# Patient Record
Sex: FEMALE | Race: WHITE | URBAN - METROPOLITAN AREA
[De-identification: names, ages, dates, MRNs, and addresses within clinical notes are randomized per-mention and may not be internally consistent; named-entity substitution may affect disease eponyms.]

---

## 2017-04-13 ENCOUNTER — TRANSFERRED RECORDS (OUTPATIENT)
Dept: HEALTH INFORMATION MANAGEMENT | Facility: CLINIC | Age: 52
End: 2017-04-13

## 2017-04-26 ENCOUNTER — ANESTHESIA EVENT (OUTPATIENT)
Dept: SURGERY | Facility: CLINIC | Age: 52
DRG: 462 | End: 2017-04-26
Payer: COMMERCIAL

## 2017-04-26 ENCOUNTER — ANESTHESIA (OUTPATIENT)
Dept: SURGERY | Facility: CLINIC | Age: 52
DRG: 462 | End: 2017-04-26
Payer: COMMERCIAL

## 2017-04-26 ENCOUNTER — APPOINTMENT (OUTPATIENT)
Dept: PHYSICAL THERAPY | Facility: CLINIC | Age: 52
DRG: 462 | End: 2017-04-26
Attending: ORTHOPAEDIC SURGERY
Payer: COMMERCIAL

## 2017-04-26 ENCOUNTER — HOSPITAL ENCOUNTER (INPATIENT)
Facility: CLINIC | Age: 52
LOS: 3 days | Discharge: HOME HEALTH | DRG: 462 | End: 2017-04-29
Attending: ORTHOPAEDIC SURGERY | Admitting: ORTHOPAEDIC SURGERY
Payer: COMMERCIAL

## 2017-04-26 DIAGNOSIS — Z96.653 S/P TOTAL KNEE REPLACEMENT, BILATERAL: Primary | ICD-10-CM

## 2017-04-26 PROCEDURE — 25000128 H RX IP 250 OP 636: Performed by: NURSE ANESTHETIST, CERTIFIED REGISTERED

## 2017-04-26 PROCEDURE — 25000125 ZZHC RX 250: Performed by: ORTHOPAEDIC SURGERY

## 2017-04-26 PROCEDURE — 25000128 H RX IP 250 OP 636: Performed by: ANESTHESIOLOGY

## 2017-04-26 PROCEDURE — 25800025 ZZH RX 258: Performed by: ANESTHESIOLOGY

## 2017-04-26 PROCEDURE — 25000125 ZZHC RX 250: Performed by: PHYSICIAN ASSISTANT

## 2017-04-26 PROCEDURE — S0020 INJECTION, BUPIVICAINE HYDRO: HCPCS | Performed by: ANESTHESIOLOGY

## 2017-04-26 PROCEDURE — 40000171 ZZH STATISTIC PRE-PROCEDURE ASSESSMENT III: Performed by: ORTHOPAEDIC SURGERY

## 2017-04-26 PROCEDURE — 25800025 ZZH RX 258: Performed by: ORTHOPAEDIC SURGERY

## 2017-04-26 PROCEDURE — 25000125 ZZHC RX 250: Performed by: ANESTHESIOLOGY

## 2017-04-26 PROCEDURE — 97161 PT EVAL LOW COMPLEX 20 MIN: CPT | Mod: GP

## 2017-04-26 PROCEDURE — 99222 1ST HOSP IP/OBS MODERATE 55: CPT | Performed by: PHYSICIAN ASSISTANT

## 2017-04-26 PROCEDURE — 36000093 ZZH SURGERY LEVEL 4 1ST 30 MIN: Performed by: ORTHOPAEDIC SURGERY

## 2017-04-26 PROCEDURE — 25000128 H RX IP 250 OP 636: Performed by: ORTHOPAEDIC SURGERY

## 2017-04-26 PROCEDURE — 27210794 ZZH OR GENERAL SUPPLY STERILE: Performed by: ORTHOPAEDIC SURGERY

## 2017-04-26 PROCEDURE — 25000128 H RX IP 250 OP 636: Performed by: PHYSICIAN ASSISTANT

## 2017-04-26 PROCEDURE — 0SRD0J9 REPLACEMENT OF LEFT KNEE JOINT WITH SYNTHETIC SUBSTITUTE, CEMENTED, OPEN APPROACH: ICD-10-PCS | Performed by: ORTHOPAEDIC SURGERY

## 2017-04-26 PROCEDURE — 27210995 ZZH RX 272: Performed by: ORTHOPAEDIC SURGERY

## 2017-04-26 PROCEDURE — 71000012 ZZH RECOVERY PHASE 1 LEVEL 1 FIRST HR: Performed by: ORTHOPAEDIC SURGERY

## 2017-04-26 PROCEDURE — 71000013 ZZH RECOVERY PHASE 1 LEVEL 1 EA ADDTL HR: Performed by: ORTHOPAEDIC SURGERY

## 2017-04-26 PROCEDURE — 25000125 ZZHC RX 250: Performed by: NURSE ANESTHETIST, CERTIFIED REGISTERED

## 2017-04-26 PROCEDURE — 37000008 ZZH ANESTHESIA TECHNICAL FEE, 1ST 30 MIN: Performed by: ORTHOPAEDIC SURGERY

## 2017-04-26 PROCEDURE — 40000193 ZZH STATISTIC PT WARD VISIT

## 2017-04-26 PROCEDURE — C1776 JOINT DEVICE (IMPLANTABLE): HCPCS | Performed by: ORTHOPAEDIC SURGERY

## 2017-04-26 PROCEDURE — 97530 THERAPEUTIC ACTIVITIES: CPT | Mod: GP

## 2017-04-26 PROCEDURE — 27810169 ZZH OR IMPLANT GENERAL: Performed by: ORTHOPAEDIC SURGERY

## 2017-04-26 PROCEDURE — 0SRC0J9 REPLACEMENT OF RIGHT KNEE JOINT WITH SYNTHETIC SUBSTITUTE, CEMENTED, OPEN APPROACH: ICD-10-PCS | Performed by: ORTHOPAEDIC SURGERY

## 2017-04-26 PROCEDURE — 36000063 ZZH SURGERY LEVEL 4 EA 15 ADDTL MIN: Performed by: ORTHOPAEDIC SURGERY

## 2017-04-26 PROCEDURE — 97110 THERAPEUTIC EXERCISES: CPT | Mod: GP

## 2017-04-26 PROCEDURE — 37000009 ZZH ANESTHESIA TECHNICAL FEE, EACH ADDTL 15 MIN: Performed by: ORTHOPAEDIC SURGERY

## 2017-04-26 PROCEDURE — 12000007 ZZH R&B INTERMEDIATE

## 2017-04-26 PROCEDURE — 99207 ZZC CONSULT E&M CHANGED TO INITIAL LEVEL: CPT | Performed by: PHYSICIAN ASSISTANT

## 2017-04-26 PROCEDURE — 25000132 ZZH RX MED GY IP 250 OP 250 PS 637: Performed by: PHYSICIAN ASSISTANT

## 2017-04-26 DEVICE — BONE CEMENT SIMPLEX W/TOBRAMYCIN 6197-9-001: Type: IMPLANTABLE DEVICE | Site: KNEE | Status: FUNCTIONAL

## 2017-04-26 DEVICE — IMP TIB BASE JJ ATTUNE RP SZ8 CEM 150610008: Type: IMPLANTABLE DEVICE | Site: KNEE | Status: FUNCTIONAL

## 2017-04-26 DEVICE — IMPLANTABLE DEVICE: Type: IMPLANTABLE DEVICE | Site: KNEE | Status: FUNCTIONAL

## 2017-04-26 DEVICE — IMP TIB BASE JJ ATTUNE RP SZ7 CEM 150610007: Type: IMPLANTABLE DEVICE | Site: KNEE | Status: FUNCTIONAL

## 2017-04-26 RX ORDER — LIDOCAINE 40 MG/G
CREAM TOPICAL
Status: DISCONTINUED | OUTPATIENT
Start: 2017-04-26 | End: 2017-04-29 | Stop reason: HOSPADM

## 2017-04-26 RX ORDER — SODIUM CHLORIDE 9 MG/ML
INJECTION, SOLUTION INTRAVENOUS CONTINUOUS
Status: DISCONTINUED | OUTPATIENT
Start: 2017-04-26 | End: 2017-04-29 | Stop reason: HOSPADM

## 2017-04-26 RX ORDER — ONDANSETRON 4 MG/1
4 TABLET, ORALLY DISINTEGRATING ORAL EVERY 6 HOURS PRN
Status: DISCONTINUED | OUTPATIENT
Start: 2017-04-26 | End: 2017-04-29 | Stop reason: HOSPADM

## 2017-04-26 RX ORDER — PROPOFOL 10 MG/ML
INJECTION, EMULSION INTRAVENOUS CONTINUOUS PRN
Status: DISCONTINUED | OUTPATIENT
Start: 2017-04-26 | End: 2017-04-26

## 2017-04-26 RX ORDER — ONDANSETRON 2 MG/ML
INJECTION INTRAMUSCULAR; INTRAVENOUS PRN
Status: DISCONTINUED | OUTPATIENT
Start: 2017-04-26 | End: 2017-04-26

## 2017-04-26 RX ORDER — ONDANSETRON 2 MG/ML
4 INJECTION INTRAMUSCULAR; INTRAVENOUS EVERY 30 MIN PRN
Status: DISCONTINUED | OUTPATIENT
Start: 2017-04-26 | End: 2017-04-26 | Stop reason: HOSPADM

## 2017-04-26 RX ORDER — PAROXETINE 40 MG/1
40 TABLET, FILM COATED ORAL
Status: DISCONTINUED | OUTPATIENT
Start: 2017-04-26 | End: 2017-04-29 | Stop reason: HOSPADM

## 2017-04-26 RX ORDER — OXYCODONE HYDROCHLORIDE 5 MG/1
5-10 TABLET ORAL
Status: DISCONTINUED | OUTPATIENT
Start: 2017-04-26 | End: 2017-04-29 | Stop reason: HOSPADM

## 2017-04-26 RX ORDER — HYDROMORPHONE HYDROCHLORIDE 1 MG/ML
.3-.5 INJECTION, SOLUTION INTRAMUSCULAR; INTRAVENOUS; SUBCUTANEOUS
Status: DISCONTINUED | OUTPATIENT
Start: 2017-04-26 | End: 2017-04-29 | Stop reason: HOSPADM

## 2017-04-26 RX ORDER — FENTANYL CITRATE 50 UG/ML
50-100 INJECTION, SOLUTION INTRAMUSCULAR; INTRAVENOUS
Status: DISCONTINUED | OUTPATIENT
Start: 2017-04-26 | End: 2017-04-26 | Stop reason: HOSPADM

## 2017-04-26 RX ORDER — CEFAZOLIN SODIUM 2 G/100ML
2 INJECTION, SOLUTION INTRAVENOUS EVERY 8 HOURS
Status: COMPLETED | OUTPATIENT
Start: 2017-04-26 | End: 2017-04-27

## 2017-04-26 RX ORDER — HYDROMORPHONE HYDROCHLORIDE 1 MG/ML
.3-.5 INJECTION, SOLUTION INTRAMUSCULAR; INTRAVENOUS; SUBCUTANEOUS EVERY 5 MIN PRN
Status: DISCONTINUED | OUTPATIENT
Start: 2017-04-26 | End: 2017-04-26 | Stop reason: HOSPADM

## 2017-04-26 RX ORDER — CEFAZOLIN SODIUM 1 G/3ML
1 INJECTION, POWDER, FOR SOLUTION INTRAMUSCULAR; INTRAVENOUS SEE ADMIN INSTRUCTIONS
Status: DISCONTINUED | OUTPATIENT
Start: 2017-04-26 | End: 2017-04-26 | Stop reason: HOSPADM

## 2017-04-26 RX ORDER — ZOLPIDEM TARTRATE 5 MG/1
5 TABLET ORAL
Status: DISCONTINUED | OUTPATIENT
Start: 2017-04-26 | End: 2017-04-29 | Stop reason: HOSPADM

## 2017-04-26 RX ORDER — MAGNESIUM HYDROXIDE 1200 MG/15ML
LIQUID ORAL PRN
Status: DISCONTINUED | OUTPATIENT
Start: 2017-04-26 | End: 2017-04-26 | Stop reason: HOSPADM

## 2017-04-26 RX ORDER — ONDANSETRON 2 MG/ML
4 INJECTION INTRAMUSCULAR; INTRAVENOUS EVERY 6 HOURS PRN
Status: DISCONTINUED | OUTPATIENT
Start: 2017-04-26 | End: 2017-04-29 | Stop reason: HOSPADM

## 2017-04-26 RX ORDER — NALOXONE HYDROCHLORIDE 0.4 MG/ML
.1-.4 INJECTION, SOLUTION INTRAMUSCULAR; INTRAVENOUS; SUBCUTANEOUS
Status: DISCONTINUED | OUTPATIENT
Start: 2017-04-26 | End: 2017-04-29 | Stop reason: HOSPADM

## 2017-04-26 RX ORDER — FENTANYL CITRATE 50 UG/ML
25-50 INJECTION, SOLUTION INTRAMUSCULAR; INTRAVENOUS
Status: DISCONTINUED | OUTPATIENT
Start: 2017-04-26 | End: 2017-04-26 | Stop reason: HOSPADM

## 2017-04-26 RX ORDER — SODIUM CHLORIDE, SODIUM LACTATE, POTASSIUM CHLORIDE, CALCIUM CHLORIDE 600; 310; 30; 20 MG/100ML; MG/100ML; MG/100ML; MG/100ML
INJECTION, SOLUTION INTRAVENOUS CONTINUOUS
Status: DISCONTINUED | OUTPATIENT
Start: 2017-04-26 | End: 2017-04-26 | Stop reason: HOSPADM

## 2017-04-26 RX ORDER — FENTANYL CITRATE 50 UG/ML
INJECTION, SOLUTION INTRAMUSCULAR; INTRAVENOUS PRN
Status: DISCONTINUED | OUTPATIENT
Start: 2017-04-26 | End: 2017-04-26

## 2017-04-26 RX ORDER — SCOLOPAMINE TRANSDERMAL SYSTEM 1 MG/1
1 PATCH, EXTENDED RELEASE TRANSDERMAL ONCE
Status: COMPLETED | OUTPATIENT
Start: 2017-04-26 | End: 2017-04-26

## 2017-04-26 RX ORDER — PROPOFOL 10 MG/ML
INJECTION, EMULSION INTRAVENOUS PRN
Status: DISCONTINUED | OUTPATIENT
Start: 2017-04-26 | End: 2017-04-26

## 2017-04-26 RX ORDER — ACETAMINOPHEN 325 MG/1
650 TABLET ORAL EVERY 4 HOURS PRN
Status: DISCONTINUED | OUTPATIENT
Start: 2017-04-29 | End: 2017-04-29 | Stop reason: HOSPADM

## 2017-04-26 RX ORDER — DEXAMETHASONE SODIUM PHOSPHATE 4 MG/ML
INJECTION, SOLUTION INTRA-ARTICULAR; INTRALESIONAL; INTRAMUSCULAR; INTRAVENOUS; SOFT TISSUE PRN
Status: DISCONTINUED | OUTPATIENT
Start: 2017-04-26 | End: 2017-04-26

## 2017-04-26 RX ORDER — MV-MIN/VIT C/GLUT/LYSINE/HB124 1000-50 MG
1 TABLET, EFFERVESCENT ORAL DAILY
COMMUNITY

## 2017-04-26 RX ORDER — CLONAZEPAM 1 MG/1
1 TABLET ORAL DAILY PRN
Status: DISCONTINUED | OUTPATIENT
Start: 2017-04-26 | End: 2017-04-29 | Stop reason: HOSPADM

## 2017-04-26 RX ORDER — AMOXICILLIN 250 MG
1-2 CAPSULE ORAL 2 TIMES DAILY
Status: DISCONTINUED | OUTPATIENT
Start: 2017-04-26 | End: 2017-04-29 | Stop reason: HOSPADM

## 2017-04-26 RX ORDER — ACETAMINOPHEN 325 MG/1
975 TABLET ORAL EVERY 8 HOURS
Status: COMPLETED | OUTPATIENT
Start: 2017-04-26 | End: 2017-04-29

## 2017-04-26 RX ORDER — ONDANSETRON 4 MG/1
4 TABLET, ORALLY DISINTEGRATING ORAL EVERY 30 MIN PRN
Status: DISCONTINUED | OUTPATIENT
Start: 2017-04-26 | End: 2017-04-26 | Stop reason: HOSPADM

## 2017-04-26 RX ORDER — TETRACAINE HCL 10 MG/ML
INJECTION SUBARACHNOID PRN
Status: DISCONTINUED | OUTPATIENT
Start: 2017-04-26 | End: 2017-04-26

## 2017-04-26 RX ORDER — CEFAZOLIN SODIUM 2 G/100ML
2 INJECTION, SOLUTION INTRAVENOUS
Status: COMPLETED | OUTPATIENT
Start: 2017-04-26 | End: 2017-04-26

## 2017-04-26 RX ORDER — HYDROXYZINE HYDROCHLORIDE 25 MG/1
25 TABLET, FILM COATED ORAL EVERY 6 HOURS PRN
Status: DISCONTINUED | OUTPATIENT
Start: 2017-04-26 | End: 2017-04-29 | Stop reason: HOSPADM

## 2017-04-26 RX ORDER — ASPIRIN 325 MG
325 TABLET ORAL DAILY
Status: DISCONTINUED | OUTPATIENT
Start: 2017-04-26 | End: 2017-04-29 | Stop reason: HOSPADM

## 2017-04-26 RX ADMIN — MIDAZOLAM HYDROCHLORIDE 2 MG: 1 INJECTION, SOLUTION INTRAMUSCULAR; INTRAVENOUS at 07:27

## 2017-04-26 RX ADMIN — ACETAMINOPHEN 975 MG: 325 TABLET, FILM COATED ORAL at 18:21

## 2017-04-26 RX ADMIN — HYDROMORPHONE HYDROCHLORIDE 0.5 MG: 1 INJECTION, SOLUTION INTRAMUSCULAR; INTRAVENOUS; SUBCUTANEOUS at 13:53

## 2017-04-26 RX ADMIN — PROPOFOL 20 MG: 10 INJECTION, EMULSION INTRAVENOUS at 11:20

## 2017-04-26 RX ADMIN — TRANEXAMIC ACID 1 G: 100 INJECTION, SOLUTION INTRAVENOUS at 07:43

## 2017-04-26 RX ADMIN — PROPOFOL 20 MG: 10 INJECTION, EMULSION INTRAVENOUS at 10:57

## 2017-04-26 RX ADMIN — CEFAZOLIN SODIUM 1 G: 2 INJECTION, SOLUTION INTRAVENOUS at 11:38

## 2017-04-26 RX ADMIN — EPINEPHRINE 20 ML GIVEN: 1 INJECTION, SOLUTION INTRAMUSCULAR; SUBCUTANEOUS at 06:31

## 2017-04-26 RX ADMIN — PAROXETINE 40 MG: 40 TABLET, FILM COATED ORAL at 18:20

## 2017-04-26 RX ADMIN — EPINEPHRINE 20 ML GIVEN: 1 INJECTION, SOLUTION INTRAMUSCULAR; SUBCUTANEOUS at 06:36

## 2017-04-26 RX ADMIN — SENNOSIDES AND DOCUSATE SODIUM 1 TABLET: 8.6; 5 TABLET ORAL at 19:48

## 2017-04-26 RX ADMIN — TRANEXAMIC ACID 1 G: 100 INJECTION, SOLUTION INTRAVENOUS at 06:51

## 2017-04-26 RX ADMIN — TRANEXAMIC ACID 1 G: 100 INJECTION, SOLUTION INTRAVENOUS at 12:34

## 2017-04-26 RX ADMIN — PROPOFOL 20 MG: 10 INJECTION, EMULSION INTRAVENOUS at 09:52

## 2017-04-26 RX ADMIN — PROPOFOL 30 MG: 10 INJECTION, EMULSION INTRAVENOUS at 12:05

## 2017-04-26 RX ADMIN — DEXAMETHASONE SODIUM PHOSPHATE 4 MG: 4 INJECTION, SOLUTION INTRA-ARTICULAR; INTRALESIONAL; INTRAMUSCULAR; INTRAVENOUS; SOFT TISSUE at 08:21

## 2017-04-26 RX ADMIN — OXYCODONE HYDROCHLORIDE 10 MG: 5 TABLET ORAL at 23:40

## 2017-04-26 RX ADMIN — FENTANYL CITRATE 50 MCG: 50 INJECTION, SOLUTION INTRAMUSCULAR; INTRAVENOUS at 12:15

## 2017-04-26 RX ADMIN — SODIUM CHLORIDE: 9 INJECTION, SOLUTION INTRAVENOUS at 16:34

## 2017-04-26 RX ADMIN — FENTANYL CITRATE 50 MCG: 50 INJECTION, SOLUTION INTRAMUSCULAR; INTRAVENOUS at 11:55

## 2017-04-26 RX ADMIN — TETRACAINE HCL 1 ML: 10 INJECTION SUBARACHNOID at 07:35

## 2017-04-26 RX ADMIN — HYDROMORPHONE HYDROCHLORIDE 0.5 MG: 1 INJECTION, SOLUTION INTRAMUSCULAR; INTRAVENOUS; SUBCUTANEOUS at 13:09

## 2017-04-26 RX ADMIN — FENTANYL CITRATE 50 MCG: 50 INJECTION, SOLUTION INTRAMUSCULAR; INTRAVENOUS at 11:20

## 2017-04-26 RX ADMIN — ACETAMINOPHEN 975 MG: 325 TABLET, FILM COATED ORAL at 23:40

## 2017-04-26 RX ADMIN — CEFAZOLIN SODIUM 2 G: 2 INJECTION, SOLUTION INTRAVENOUS at 19:48

## 2017-04-26 RX ADMIN — MIDAZOLAM HYDROCHLORIDE 2 MG: 1 INJECTION, SOLUTION INTRAMUSCULAR; INTRAVENOUS at 06:45

## 2017-04-26 RX ADMIN — PROPOFOL 20 MG: 10 INJECTION, EMULSION INTRAVENOUS at 08:50

## 2017-04-26 RX ADMIN — PROPOFOL 20 MG: 10 INJECTION, EMULSION INTRAVENOUS at 07:40

## 2017-04-26 RX ADMIN — PHENYLEPHRINE HYDROCHLORIDE 100 MCG: 10 INJECTION, SOLUTION INTRAMUSCULAR; INTRAVENOUS; SUBCUTANEOUS at 07:37

## 2017-04-26 RX ADMIN — PROPOFOL 100 MCG/KG/MIN: 10 INJECTION, EMULSION INTRAVENOUS at 07:36

## 2017-04-26 RX ADMIN — FENTANYL CITRATE 100 MCG: 50 INJECTION, SOLUTION INTRAMUSCULAR; INTRAVENOUS at 06:45

## 2017-04-26 RX ADMIN — PROPOFOL 20 MG: 10 INJECTION, EMULSION INTRAVENOUS at 11:52

## 2017-04-26 RX ADMIN — OXYCODONE HYDROCHLORIDE 10 MG: 5 TABLET ORAL at 19:48

## 2017-04-26 RX ADMIN — ASPIRIN 325 MG ORAL TABLET 325 MG: 325 PILL ORAL at 18:23

## 2017-04-26 RX ADMIN — FENTANYL CITRATE 50 MCG: 50 INJECTION, SOLUTION INTRAMUSCULAR; INTRAVENOUS at 09:53

## 2017-04-26 RX ADMIN — DEXMEDETOMIDINE HYDROCHLORIDE 0.2 MCG/KG/HR: 100 INJECTION, SOLUTION INTRAVENOUS at 07:55

## 2017-04-26 RX ADMIN — HYDROMORPHONE HYDROCHLORIDE 0.5 MG: 1 INJECTION, SOLUTION INTRAMUSCULAR; INTRAVENOUS; SUBCUTANEOUS at 16:34

## 2017-04-26 RX ADMIN — FENTANYL CITRATE 50 MCG: 50 INJECTION, SOLUTION INTRAMUSCULAR; INTRAVENOUS at 07:30

## 2017-04-26 RX ADMIN — FENTANYL CITRATE 50 MCG: 50 INJECTION, SOLUTION INTRAMUSCULAR; INTRAVENOUS at 13:12

## 2017-04-26 RX ADMIN — DEXMEDETOMIDINE HYDROCHLORIDE 8 MCG: 100 INJECTION, SOLUTION INTRAVENOUS at 12:04

## 2017-04-26 RX ADMIN — CEFAZOLIN SODIUM 2 G: 2 INJECTION, SOLUTION INTRAVENOUS at 07:40

## 2017-04-26 RX ADMIN — SODIUM CHLORIDE, POTASSIUM CHLORIDE, SODIUM LACTATE AND CALCIUM CHLORIDE: 600; 310; 30; 20 INJECTION, SOLUTION INTRAVENOUS at 06:27

## 2017-04-26 RX ADMIN — ONDANSETRON 4 MG: 2 INJECTION INTRAMUSCULAR; INTRAVENOUS at 12:09

## 2017-04-26 RX ADMIN — SCOPALAMINE 1 PATCH: 1 PATCH, EXTENDED RELEASE TRANSDERMAL at 06:52

## 2017-04-26 RX ADMIN — CEFAZOLIN SODIUM 1 G: 2 INJECTION, SOLUTION INTRAVENOUS at 09:40

## 2017-04-26 RX ADMIN — PROPOFOL 20 MG: 10 INJECTION, EMULSION INTRAVENOUS at 09:41

## 2017-04-26 ASSESSMENT — ACTIVITIES OF DAILY LIVING (ADL)
SWALLOWING: 0-->SWALLOWS FOODS/LIQUIDS WITHOUT DIFFICULTY
AMBULATION: 0-->INDEPENDENT
TOILETING: 0-->INDEPENDENT
DRESS: 0-->INDEPENDENT
TRANSFERRING: 0-->INDEPENDENT
TOILETING: 0-->INDEPENDENT
COGNITION: 0 - NO COGNITION ISSUES REPORTED
BATHING: 0-->INDEPENDENT
SWALLOWING: 0-->SWALLOWS FOODS/LIQUIDS WITHOUT DIFFICULTY
FALL_HISTORY_WITHIN_LAST_SIX_MONTHS: NO
BATHING: 0-->INDEPENDENT
TRANSFERRING: 0-->INDEPENDENT
RETIRED_EATING: 0-->INDEPENDENT
DRESS: 0-->INDEPENDENT
EATING: 0-->INDEPENDENT
COMMUNICATION: 0-->UNDERSTANDS/COMMUNICATES WITHOUT DIFFICULTY
RETIRED_COMMUNICATION: 0-->UNDERSTANDS/COMMUNICATES WITHOUT DIFFICULTY
AMBULATION: 0-->INDEPENDENT

## 2017-04-26 NOTE — IP AVS SNAPSHOT
MRN:[id number]                      After Visit Summary   4/26/2017    Perla Sanderson    MRN: [id number]           Thank you!     Thank you for choosing [facility] for your care. Our goal is always to provide you with excellent care. Hearing back from our patients is one way we can continue to improve our services. Please take a few minutes to complete the written survey that you may receive in the mail after you visit with us. Thank you!        Patient Information     Date Of Birth          1965        Designated Caregiver       Most Recent Value    Caregiver    Will someone help with your care after discharge? yes    Name of designated caregiver Rita gordon    Phone number of caregiver see emergency contact #    Caregiver address [place], [place]      About your hospital stay     You were admitted on:  April 26, 2017 You last received care in the:  [facility] [address] [facility]    You were discharged on:  April 29, 2017        Reason for your hospital stay       Bilateral total knee replacement                  Who to Call     For medical emergencies, please call 911.  For non-urgent questions about your medical care, please call your primary care provider or clinic, [phone]  For questions related to your surgery, please call your surgery clinic        Attending Provider     Provider Specialty    Michelle Chauhan MD Orthopedics       Primary Care Provider Office Phone # Fax #    Janice Briones [phone]       [address] [address]        After Care Instructions     Activity       Your activity upon discharge: activity as tolerated, no driving while on narcotics            Diet       Follow this diet upon discharge: Regular            Wound care and dressings       Instructions to care for your wound at home: leave aquacel dressing on until follow up appointment.                  Follow-up Appointments      "Follow-up and recommended labs and tests        Follow up with Dr. Chauhan in 2 weeks. Call upon discharge if not yet scheduled. [phone]                  Additional Services     Home Care PT Referral for Hospital Discharge       PT to eval and treat    Your provider has ordered home care - physical therapy. If you have not been contacted within 2 days of your discharge please call the department phone number listed on the top of this document.                  Future tests that were ordered for you     Compression stockings                 Pending Results     No orders found from 2017 to 2017.            Admission Information     Date & Time Provider Department Dept. Phone    2017 Michelle Chauhan MD [facility] [address] [facility] [phone]      Your Vitals Were     Blood Pressure Pulse Temperature Respirations Height Weight    124/65 (BP Location: Left arm) 79 97  F (36.1  C) (Oral) 16 1.905 m (6' 3\") 84.8 kg (187 lb)    Pulse Oximetry BMI (Body Mass Index)                99% 23.37 kg/m2          [employer] Information     [employer] lets you send messages to your doctor, view your test results, renew your prescriptions, schedule appointments and more. To sign up, go to [email or web address] . Click on \"Log in\" on the left side of the screen, which will take you to the Welcome page. Then click on \"Sign up Now\" on the right side of the page.     You will be asked to enter the access code listed below, as well as some personal information. Please follow the directions to create your username and password.     Your access code is: [email or web address]  Expires: 2017  7:12 AM     Your access code will  in 90 days. If you need help or a new code, please call your [facility] clinic or [phone].        Care EveryWhere ID     This is your Care EveryWhere ID. This could be used by other organizations to access your [facility] medical records  [id number]           Review of your " medicines      START taking        Dose / Directions    aspirin 325 MG tablet        Dose:  325 mg   Take 1 tablet (325 mg) by mouth daily   Quantity:  30 tablet   Refills:  0       hydrOXYzine 25 MG tablet   Commonly known as:  ATARAX        Dose:  25 mg   Take 1 tablet (25 mg) by mouth every 6 hours as needed for itching   Quantity:  40 tablet   Refills:  0       order for DME        Equipment being ordered: Walker Wheels () and Walker () Treatment Diagnosis: Impaired gait   Quantity:  1 each   Refills:  0       oxyCODONE 5 MG IR tablet   Commonly known as:  ROXICODONE        Dose:  5-10 mg   Take 1-2 tablets (5-10 mg) by mouth every 3 hours as needed for moderate to severe pain   Quantity:  50 tablet   Refills:  0       senna-docusate 8.6-50 MG per tablet   Commonly known as:  SENOKOT-S;PERICOLACE        Dose:  1-2 tablet   Take 1-2 tablets by mouth 2 times daily   Quantity:  100 tablet   Refills:  1         CONTINUE these medicines which may have CHANGED, or have new prescriptions. If we are uncertain of the size of tablets/capsules you have at home, strength may be listed as something that might have changed.        Dose / Directions    AMBIEN PO   This may have changed:  Another medication with the same name was removed. Continue taking this medication, and follow the directions you see here.        Dose:  10 mg   Take 10 mg by mouth nightly as needed for sleep (if going to bed late (after 2200))   Refills:  0         CONTINUE these medicines which have NOT CHANGED        Dose / Directions    AIRBORNE Tbef        Dose:  1 Dose   Take 1 Dose by mouth daily   Refills:  0       KLONOPIN PO        Dose:  1 mg   Take 1 mg by mouth daily as needed for anxiety   Refills:  0       PAROXETINE HCL PO        Dose:  40 mg   Take 40 mg by mouth every 24 hours At 1300   Refills:  0            Where to get your medicines      These medications were sent to [facility] [place], [place] - [address]   [address], [place] [address]     Phone:  [phone]     aspirin 325 MG tablet    senna-docusate 8.6-50 MG per tablet         These medications were sent to [id number] - [place] - [address]  [address] [place] [address]     Phone:  [phone]     hydrOXYzine 25 MG tablet         Some of these will need a paper prescription and others can be bought over the counter. Ask your nurse if you have questions.     Bring a paper prescription for each of these medications     order for DME    oxyCODONE 5 MG IR tablet                Protect others around you: Learn how to safely use, store and throw away your medicines at www.disposemymeds.org.             Medication List: This is a list of all your medications and when to take them. Check marks below indicate your daily home schedule. Keep this list as a reference.      Medications           Morning Afternoon Evening Bedtime As Needed    AIRBORNE Tbef   Take 1 Dose by mouth daily                                AMBIEN PO   Take 10 mg by mouth nightly as needed for sleep (if going to bed late (after 2200))                                aspirin 325 MG tablet   Take 1 tablet (325 mg) by mouth daily   Last time this was given:  325 mg on 4/29/2017  8:03 AM                                hydrOXYzine 25 MG tablet   Commonly known as:  ATARAX   Take 1 tablet (25 mg) by mouth every 6 hours as needed for itching   Last time this was given:  25 mg on 4/29/2017  8:04 AM                                KLONOPIN PO   Take 1 mg by mouth daily as needed for anxiety   Last time this was given:  1 mg on 4/29/2017 12:01 PM                                order for DME   Equipment being ordered: Walker Wheels () and Walker () Treatment Diagnosis: Impaired gait                                oxyCODONE 5 MG IR tablet   Commonly known as:  ROXICODONE   Take 1-2 tablets (5-10 mg) by mouth every 3 hours as needed for moderate to severe pain    Last time this was given:  10 mg on 4/29/2017 11:09 AM                                PAROXETINE HCL PO   Take 40 mg by mouth every 24 hours At 1300   Last time this was given:  40 mg on 4/29/2017 12:07 PM                                senna-docusate 8.6-50 MG per tablet   Commonly known as:  SENOKOT-S;PERICOLACE   Take 1-2 tablets by mouth 2 times daily   Last time this was given:  2 tablets on 4/29/2017  8:04 AM

## 2017-04-26 NOTE — ANESTHESIA PREPROCEDURE EVALUATION
Anesthesia Evaluation     . Pt has had prior anesthetic. Type: General and Regional    History of anesthetic complications   - PONV        ROS/MED HX    ENT/Pulmonary:  - neg pulmonary ROS     Neurologic:  - neg neurologic ROS     Cardiovascular:  - neg cardiovascular ROS       METS/Exercise Tolerance:  >4 METS   Hematologic:         Musculoskeletal:   (+) arthritis, , , -       GI/Hepatic:         Renal/Genitourinary:      (-) renal disease   Endo:      (-) Type II DM   Psychiatric:     (+) psychiatric history anxiety      Infectious Disease:         Malignancy:         Other:                     Physical Exam  Normal systems: dental    Airway   Mallampati: I  TM distance: >3 FB  Neck ROM: full    Dental     Cardiovascular   Rhythm and rate: regular and normal      Pulmonary    breath sounds clear to auscultation                    Anesthesia Plan      History & Physical Review  History and physical reviewed and following examination; no interval change.    ASA Status:  2 .    NPO Status:  > 8 hours    Plan for General, Spinal and Periph. Nerve Block for postop pain   PONV prophylaxis:  Ondansetron (or other 5HT-3), Scopolamine patch and Dexamethasone or Solumedrol       Postoperative Care  Postoperative pain management:  IV analgesics and Oral pain medications.      Consents  Anesthetic plan, risks, benefits and alternatives discussed with:  Patient..                          .

## 2017-04-26 NOTE — PLAN OF CARE
Problem: Goal Outcome Summary  Goal: Goal Outcome Summary  PT: Orders received, eval completed, treatment initiated. Pt is POD 0 B TKA. Prior to admit pt was living with 3 children in a house, no AD use, independent with mobility. Pt demonstrates pain, dec strength, balance, activity tolerance and difficulty ambulating and transferring and would benefit from skilled PT services in order to improve this. Pt plans to discharge to home with OPPT. AAROM R knee  deg, L knee  deg.     Surgeon Discharge Plan: none documented     Current Functional Status: Currently requires SBA for bed mobility, CGA sit to/from stand with FWW and B KI, CGA for gait of 3 ft fwd and back with FWW and B KI with no buckling noted. BP low but pt very motivated to stand and take steps today, denies dizziness. Participated well in strengthening activities.     Barriers to Plan/Home: Stairs, needs assist

## 2017-04-26 NOTE — PROGRESS NOTES
Admission medication history interview status for the 4/26/2017  admission is complete. See EPIC admission navigator for prior to admission medications     Medication history source reliability:Good    Medication history interview source(s):Patient    Medication history resources (including written lists, pill bottles, clinic record):written list    Primary pharmacy.[facility]    Additional medication history information not noted on PTA med list :None    Time spent in this activity: 45 minutes    Prior to Admission medications    Medication Sig Last Dose Taking? Auth Provider   Multiple Vitamins-Minerals (AIRBORNE) TBEF Take 1 Dose by mouth daily 4/25/2017 at prn Yes Reported, Patient   ClonazePAM (KLONOPIN PO) Take 1 mg by mouth daily as needed for anxiety  4/22/2017 at prn Yes Reported, Patient   Zolpidem Tartrate (AMBIEN PO) Take 10 mg by mouth nightly as needed for sleep (if going to bed late (after 2200))  4/25/2017 at pm Yes Reported, Patient   Zolpidem Tartrate (AMBIEN CR PO) Take 12.5 mg by mouth nightly as needed (if going to bed early (before 2200))  more than a week at prn Yes Reported, Patient   PAROXETINE HCL PO Take 40 mg by mouth every 24 hours At 1300 4/25/2017 at 1300 Yes Reported, Patient

## 2017-04-26 NOTE — PLAN OF CARE
Problem: Goal Outcome Summary  Goal: Goal Outcome Summary  Outcome: Improving  Pain well controlled with iV Dilaudid, dressing clean, dry and intact, CMS is WNL, VSS, taking food and fluids without nausea. Malhotra and Hemovacs intact and patent. B/P's have been on the low side so getting up OOB was deferred.

## 2017-04-26 NOTE — OP NOTE
DATE OF PROCEDURE:  04/26/2017      PREOPERATIVE DIAGNOSIS:  Bilateral osteoarthritis of the knee with 30-degree flexion contracture bilaterally.      POSTOPERATIVE DIAGNOSIS:  Bilateral osteoarthritis of the knee with 30-degree flexion contracture bilaterally.      PROCEDURE:  Complex right and left total knee arthroplasty.        ASSISTANT:   SHIRIN FLORES PA-C      IMPLANTS USED:  On the right side were the DePuy Attune knee system, posterior stabilized rotating platform size #8 femur, size #8 tibia, 41 mm patellar button, had 8 mm tibial polyethylene.  Implants used on the left side are again DePuy Attune knee system, posterior stabilized rotating platform, #7 femur, #7 tibia, 8 mm of tibial polyethylene and 41 mm patellar button.      INDICATIONS FOR PROCEDURE:  Perla Sanderson is a 51-year-old female with a long history of severe tricompartmental osteoarthritis of the knee.  She has developed 25-30 degree flexion contracture of the knee bilaterally.  I saw the patient in consultation at which time I discussed the nature of her condition with her and outlined both nonoperative and operative treatment and recommended the above stated procedure.  I explained the risks, benefits, potential complications of total knee arthroplasty.  This discussion included but was not specific to infection, vascular neurologic complications, DVT, septic and aseptic loosening, arthrofibrosis, possible need for further revision surgery.  It should be noted the patient had previously been treated with nonsteroidals and activity modification, followed by injection treatments and arthroscopy.  She has gone on to develop severe bilateral osteoarthritis of the knee and has had difficulty with activities of daily living.      ANESTHESIA:  General:      PROCEDURE:  The patient was taken to the operating room and placed on the operating table in the supine position.  After adequate induction of a general anesthetic, a bump was placed beneath  the right hip and pneumatic tourniquet was applied to the right thigh.  The patient was given 2 grams Ancef for infection prophylaxis given 1 hour prior to incision.  Then performed a sterile prep and drape of the right knee and right lower extremity.  After sterile prep and drape, the limb was exsanguinated, tourniquet was elevated to 300 mmHg.  I then made a midline incision exposing the extensor mechanism.  Proceeded with a medial parapatellar arthrotomy in a quad splitting approach.  I identified the entry point for the intramedullary guide for the femur set at 5 degrees of valgus and made my distal femoral cut.  We did cut 11 mm off the distal femur in order to address her flexion contracture.  Once the distal femoral cut was made, we then applied the jig for 3 degrees of external rotation of the femoral component and made anterior and posterior cuts along with anterior and posterior chamfers.  We then directed our attention to the tibia where I used extramedullary guide to establish a neutral cut of the tibia based off the deficient medial side.  In order to gain exposure and to correct the flexion contracture releases were done off the medial side of the tibia and the posterior aspect of the femur and the posterior aspect of the tibia.  Once the tibial cut was made, we checked our flexion, extension gaps and found them to be equal.  We had a good overall alignment.  We then finished preparation of the femur by making the box cut and finished the preparation of the tibia.  At this point, we injected the posterior capsule with a mixture of ropivacaine and Toradol.  We then sized the patella and made our patellar cuts and finished preparation of the patella.  While the cement was being mixed, we began preparing the cement surfaces using pulsatile jet lavage with antibiotic saline.  Once the cement was of appropriate consistency, we cemented first the tibial component followed by the femoral component.  Once the  component was fully seated, excess cement was removed using Halma elevator.  We then placed the knee in full extension with trial polyethylene in place and allowed the cement to harden.  We also cemented the patellar component and again removed any excess cement using Halma elevator.  We then soaked the knee in dilute solution of Betadine for 3 minutes irrigated using pulse jet lavage using 3 liters of antibiotic saline and pulsatile lavage.  Once the cement had hardened, we took the knee through a range of motion.  Patella tracked ideally.  Good soft tissue balance in both flexion and extension.  We were able to obtain full extension without difficulty.  We then removed the trial polyethylene and inspected the joint for loose bone and cement and removed it when it was found.  We irrigated using with pulsatile lavage and put in the actual rotating platform tibial polyethylene and reduced the knee and again checked our range of motion and made good soft tissue balance in both flexion and extension.  We were able to obtain full extension without difficulty.  We then put in the knee in approximately 30 degrees of flexion, irrigated using pulsatile lavage.  Put a medium Hemovac drain deep to the fascia and closed the arthrotomy using 0 Ethibond sutures.  The deep subcu was closed using 0 Vicryl, superficial subcu was closed using 2-0 Vicryl and skin was closed with staples.  We then applied a sterile dressing and sterile Ace.  We then placed the drape over the right knee and proceeded onto the left knee.  Regowned and gloved.  We did let down the tourniquet on the right knee and the tourniquet was actually disconnected in order to make sure that there was no prolonged tourniquet time.  Then went to the left side, exsanguinated and elevated the tourniquet to 300 mmHg. I made a midline incision exposing the extensor mechanism.  Proceeded with a medial parapatellar arthrotomy in a quad-splitting approach.  We then  identified the entry point for the intramedullary guide of the femur.  Set at 5 degrees of valgus and made my distal femoral cut.  We then sized the femur appropriately and applied the jig for 3 degrees external rotation of the femoral component.  Then made our anterior and posterior cuts along with anterior, posterior and chamfers.  In order to correct the flexion and varus flexion contracture, release was done off the medial side of the tibia and off the posterior aspect of the femur.  We then used the extramedullary guide to establish neutral cut of the tibia based off the deficient medial side.  Once the tibial cut was made, we checked our flexion, extension gaps and they were roughly equal; however, had some difficulty in extension, so I elected to recut the distal femur.  Once the distal femur was recut we had equal flexion and extension gaps.  We then finished preparation of the femur by making the box cut and finished the preparation of the tibia.  We then sized the patella, made our patellar cuts and finished preparation of the patella.  At this point, we also injected the posterior capsule with a mixture of ropivacaine and Toradol.  While the cement was being mixed,  we began preparing the cement surfaces using pulsatile lavage with antibiotic saline.  Once the cement was of appropriate consistency, we cemented first the tibial component followed by the femoral component.  Once these components were fully seated, excess cement was removed using [place] elevator.  We then placed the knee in full extension with trial polyethylene in place and allowed the cement to harden.  At this point, we cemented the patellar component.  Again, removed any excess cement using [place] elevator.  We then soaked the knee in dilute solution of Betadine for 3 minutes, irrigated using pulse jet lavage used approximately 3 liters of antibiotic saline and pulsatile lavage.  Once the cement had hardened, we took the knee through a  range of motion.  Patella tracked ideally removed the trial polyethylene and inspected the joint for loose bone and cement and removed it when it was found.  We irrigated using pulsatile jet lavage, then put in the actual rotating platform tibial polyethylene and reduced the knee again checked our patellar tracking.  We had ideal tracking off the patella we were able to obtain full extension without difficulty.  We had good soft tissue balancing and full flexion and extension.  We then placed the knee in approximately 30 degrees of flexion, irrigated using pulsatile jet lavage.  Put a medium Hemovac drain deep to the fascia and closed the arthrotomy using 0 Ethibond sutures.  The deep subcu was closed using 0 Vicryl, superficial subcu was closed with 2-0 Vicryl, skin was closed with staples.  At each layer of closure,  the wound was copiously irrigated using antibiotic saline.  Sterile dressing was applied followed by a knee immobilizer.  The patient tolerated the operative procedure.  There were no intraoperative complications.  The patient was sent to [facility] in stable condition.  Plan will be for the patient to get 24 hours of Ancef for infection prophylaxis, 30 days aspirin for DVT prophylaxis.      The cement used in both the right and left knee was Simplex with Tobramycin.         BRENT GONZALEZ MD             D: 2017 12:38   T: 2017 14:34   MT: [id number]      Name:     EDOUARD BRANDT   MRN:      [id number]        Account:        [id number]   :      1965           Procedure Date: 2017      Document: [id number]

## 2017-04-26 NOTE — ANESTHESIA POSTPROCEDURE EVALUATION
Patient: Perla Sanderson    Procedure(s):  BILATERAL KNEE TOTAL ARTHROPLASTY  - Wound Class: I-Clean   - Wound Class: I-Clean    Diagnosis:BILATERAL DJD   Diagnosis Additional Information: No value filed.    Anesthesia Type:  General, Spinal, Periph. Nerve Block for postop pain    Note:  Anesthesia Post Evaluation    Patient location during evaluation: PACU  Patient participation: Able to fully participate in evaluation  Level of consciousness: awake  Pain management: adequate  Airway patency: patent  Cardiovascular status: acceptable  Respiratory status: acceptable  Hydration status: acceptable  PONV: none     Anesthetic complications: None          Last vitals:  Vitals:    04/26/17 1430 04/26/17 1440 04/26/17 1505   BP: (!) 89/52 98/57 91/57   Pulse:      Resp: 10 10 12   Temp:   36.7  C (98.1  F)   SpO2: 95% 96% 96%         Electronically Signed By: Chao Giron MD  April 26, 2017  4:01 PM

## 2017-04-26 NOTE — PROGRESS NOTES
04/26/17 1555   Quick Adds   Type of Visit Initial PT Evaluation   Living Environment   Lives With child(vasu), dependent  (3 children)   Living Arrangements house  (4 stories)   Home Accessibility stairs to enter home;stairs within home   Number of Stairs to Enter Home 0   Number of Stairs Within Home 8   Stair Railings at Home outside, present on right side   Self-Care   Usual Activity Tolerance excellent   Current Activity Tolerance good   Regular Exercise no   Equipment Currently Used at Home none   Functional Level Prior   Ambulation 0-->independent   Transferring 0-->independent   Fall history within last six months no   Which of the above functional risks had a recent onset or change? none   General Information   Onset of Illness/Injury or Date of Surgery - Date 04/26/17   Referring Physician Di Arnold PA-C   Patient/Family Goals Statement return home with friend assist and OPPT   Pertinent History of Current Problem (include personal factors and/or comorbidities that impact the POC) s/p B TKA. PMH: anxiety, depression, PTSD.    Precautions/Limitations fall precautions   Weight-Bearing Status - LLE weight-bearing as tolerated   Weight-Bearing Status - RLE weight-bearing as tolerated   Cognitive Status Examination   Orientation orientation to person, place and time   Level of Consciousness alert   Follows Commands and Answers Questions 100% of the time;able to follow multistep instructions   Personal Safety and Judgment intact   Memory intact   Pain Assessment   Patient Currently in Pain Yes, see Vital Sign flowsheet  (5/10)   Posture    Posture Not impaired   Range of Motion (ROM)   ROM Comment B LEs limited by pain and surgery   Strength   Strength Comments B LEs functionally weak   Bed Mobility   Bed Mobility Comments SBA supine to sit   Transfer Skills   Transfer Comments CGA sit to stand with FWW and  B KI   Gait   Gait Comments Pt amb 3 ft fwd and back with FWW and CGA with B KI donned, no  "buckling   Balance   Balance Comments Balance steady with walker   Sensory Examination   Sensory Perception Comments Denies numbness and tingling   General Therapy Interventions   Planned Therapy Interventions bed mobility training;gait training;ROM;strengthening;transfer training   Clinical Impression   Criteria for Skilled Therapeutic Intervention yes, treatment indicated   PT Diagnosis Difficulty ambulating   Influenced by the following impairments Pain, dec strength, balance, activity tolerance   Functional limitations due to impairments Difficulty ambulating and transferring   Clinical Presentation Stable/Uncomplicated   Clinical Presentation Rationale medically stable post-op   Clinical Decision Making (Complexity) Low complexity   Therapy Frequency` 2 times/day   Predicted Duration of Therapy Intervention (days/wks) 4 days   Anticipated Discharge Disposition Home with Outpatient Therapy   Risk & Benefits of therapy have been explained Yes   Patient, Family & other staff in agreement with plan of care Yes   [facility] [employer] TM \"6 Clicks\"   2016, Trustees of [facility], under license to [employer].  All rights reserved.   6 Clicks Short Forms Basic Mobility Inpatient Short Form   [facility] AM-PAC  \"6 Clicks\" V.2 Basic Mobility Inpatient Short Form   1. Turning from your back to your side while in a flat bed without using bedrails? 4 - None   2. Moving from lying on your back to sitting on the side of a flat bed without using bedrails? 3 - A Little   3. Moving to and from a bed to a chair (including a wheelchair)? 3 - A Little   4. Standing up from a chair using your arms (e.g., wheelchair, or bedside chair)? 3 - A Little   5. To walk in hospital room? 3 - A Little   6. Climbing 3-5 steps with a railing? 2 - A Lot   Basic Mobility Raw Score (Score out of 24.Lower scores equate to lower levels of function) 18   Total Evaluation Time   Total Evaluation Time (Minutes) 15        04/26/17 " 1555   Quick Adds   Type of Visit Initial PT Evaluation   Living Environment   Lives With child(vasu), dependent  (3 children)   Living Arrangements house  (4 stories)   Home Accessibility stairs to enter home;stairs within home   Number of Stairs to Enter Home 0   Number of Stairs Within Home 8   Stair Railings at Home outside, present on right side   Self-Care   Usual Activity Tolerance excellent   Current Activity Tolerance good   Regular Exercise no   Equipment Currently Used at Home none   Functional Level Prior   Ambulation 0-->independent   Transferring 0-->independent   Fall history within last six months no   Which of the above functional risks had a recent onset or change? none   General Information   Onset of Illness/Injury or Date of Surgery - Date 04/26/17   Referring Physician Di Arnold PA-C   Patient/Family Goals Statement return home with friend assist and OPPT   Pertinent History of Current Problem (include personal factors and/or comorbidities that impact the POC) s/p B TKA. PMH: anxiety, depression, PTSD.    Precautions/Limitations fall precautions   Weight-Bearing Status - LLE weight-bearing as tolerated   Weight-Bearing Status - RLE weight-bearing as tolerated   Cognitive Status Examination   Orientation orientation to person, place and time   Level of Consciousness alert   Follows Commands and Answers Questions 100% of the time;able to follow multistep instructions   Personal Safety and Judgment intact   Memory intact   Pain Assessment   Patient Currently in Pain Yes, see Vital Sign flowsheet  (5/10)   Posture    Posture Not impaired   Range of Motion (ROM)   ROM Comment B LEs limited by pain and surgery   Strength   Strength Comments B LEs functionally weak   Bed Mobility   Bed Mobility Comments SBA supine to sit   Transfer Skills   Transfer Comments CGA sit to stand with FWW and  B KI   Gait   Gait Comments Pt amb 3 ft fwd and back with FWW and CGA with B KI donned, no buckling   Balance  "  Balance Comments Balance steady with walker   Sensory Examination   Sensory Perception Comments Denies numbness and tingling   General Therapy Interventions   Planned Therapy Interventions bed mobility training;gait training;ROM;strengthening;transfer training   Clinical Impression   Criteria for Skilled Therapeutic Intervention yes, treatment indicated   PT Diagnosis Difficulty ambulating   Influenced by the following impairments Pain, dec strength, balance, activity tolerance   Functional limitations due to impairments Difficulty ambulating and transferring   Clinical Presentation Stable/Uncomplicated   Clinical Presentation Rationale medically stable post-op   Clinical Decision Making (Complexity) Low complexity   Therapy Frequency` 2 times/day   Predicted Duration of Therapy Intervention (days/wks) 4 days   Anticipated Discharge Disposition Home with Outpatient Therapy   Risk & Benefits of therapy have been explained Yes   Patient, Family & other staff in agreement with plan of care Yes   [facility] [employer] TM \"6 Clicks\"   2016, Trustees of [facility], under license to [employer].  All rights reserved.   6 Clicks Short Forms Basic Mobility Inpatient Short Form   [facility] AM-PAC  \"6 Clicks\" V.2 Basic Mobility Inpatient Short Form   1. Turning from your back to your side while in a flat bed without using bedrails? 4 - None   2. Moving from lying on your back to sitting on the side of a flat bed without using bedrails? 3 - A Little   3. Moving to and from a bed to a chair (including a wheelchair)? 3 - A Little   4. Standing up from a chair using your arms (e.g., wheelchair, or bedside chair)? 3 - A Little   5. To walk in hospital room? 3 - A Little   6. Climbing 3-5 steps with a railing? 2 - A Lot   Basic Mobility Raw Score (Score out of 24.Lower scores equate to lower levels of function) 18   Total Evaluation Time   Total Evaluation Time (Minutes) 15     "

## 2017-04-26 NOTE — CONSULTS
PRIMARY CARE PHYSICIAN:  Janice Briones MD       REQUESTING PHYSICIAN:  Michelle Chauhan MD.      REASON FOR CONSULTATION:  Postoperative management.      HISTORY OF PRESENT ILLNESS:  Perla Sanderson is a 51-year-old female with past medical history of depression and anxiety who is status post bilateral knee arthroplasties.  Procedures were performed by Dr. Chauhan under spinal anesthesia.  The patient tolerated the procedure well and estimated blood loss was 5 mL.      Presently the patient is evaluated in the hospital room.  Offers no complaints at this time.  Pain is well controlled.  She was noted to be hypotensive immediately postoperatively.  She denies any issues with dizziness or lightheadedness.  She has low normal blood pressure at baseline and notes that she typically runs in the low 100 systolics.  Denies any chest pain or shortness of breath.      PAST MEDICAL HISTORY:   1.  PTSD.   2.  Depression.      PRIOR TO ADMISSION MEDICATIONS:   1.  Klonopin 1 mg daily p.r.n.    2.  Paxil 40 mg daily.   3.  Ambien p.r.n.      ALLERGIES:  Codeine.      PAST SURGICAL HISTORY:   1.  Hysterectomy.   2.  Multiple knee arthroscopies.   3.  Cholecystectomy.      FAMILY HISTORY:  No family history of heart disease, cancer or diabetes.      SOCIAL HISTORY:  She is a nonsmoker, drinks minimal alcohol.      REVIEW OF SYSTEMS:  A 10-point review of systems was completed.  Pertinent positives are noted in HPI.  All other systems negative.      PHYSICAL EXAMINATION:   GENERAL:  Perla Sanderson is a well-developed, well-nourished 51-year-old female who is resting comfortably in no acute distress.   VITAL SIGNS:  Blood pressure is 91/57, heart rate 60, temperature 98.1.   HEENT:  Head normocephalic, atraumatic.  Eyes:  Pupils equal, round, reactive to light.   CARDIOVASCULAR:  Regular rate, rhythm, no murmurs.   PULMONARY:  Normal effort.  Lungs are clear to auscultation bilaterally.   GASTROINTESTINAL:  Normal bowel sounds.   Abdomen is soft and nontender.   EXTREMITIES:  Moves all 4 extremities.  Dorsalis pedis and radial pulses are palpable  bilaterally.   NEUROLOGIC:  Alert and oriented.  Cranial nerves 2-12 grossly intact.      LABORATORY DATA:  No labs in Epic.  BMP and hemoglobin for the morning.      ASSESSMENT:  Edouard Sanderson is a 51-year-old female with past medical history of anxiety who is status post bilateral knee arthroplasties.  Hospitalist consulted for medical comanagement.   1.  Status post bilateral knee arthroplasties.  Postoperative day #0.  Routine postoperative cares and pain control will be deferred to Orthopedics.   2.  Postoperative hypotension.  The patient is asymptomatic.  She has low normal blood pressure at baseline.  We will increase fluids to 100 mL per hour.  Monitor.  Check hemoglobin in the morning.   3.  Anxiety/depression.  Continue prior to admission Paxil, Klonopin, and p.r.n. Ambien.   4.  Deep venous thrombosis prophylaxis:  Deferred to Orthopedics.  The patient has been initiated on full dose aspirin postoperatively.      CODE STATUS:  Full code.      We, the Hospitalist Service, thank you for this consult.  We will follow up on patient tomorrow to ensure that she is medically stable at which point we will likely sign off.      This patient was discussed with Dr. Pereira of the Hospitalist Service who independently interviewed and examined the patient.  She is in agreement with above plan.         SYLWIA PEREIRA MD       As dictated by MICHAEL FREEMAN PA-C            D: 2017 16:13   T: 2017 17:43   MT: SPEEDY      Name:     EDOUARD SANDERSON   MRN:      [id number]        Account:       [id number]   :      1965           Consult Date:  2017      Document: [id number]       cc: Michelle Briones MD

## 2017-04-26 NOTE — ANESTHESIA PROCEDURE NOTES
Peripheral nerve/Neuraxial procedure note : femoral and Adductor canal  Pre-Procedure  Performed by MARK DWYER  Location: pre-op      Pre-Anesthestic Checklist: patient identified, IV checked, site marked, risks and benefits discussed, informed consent, monitors and equipment checked, at physician/surgeon's request and post-op pain management    Timeout  Correct Patient: Yes   Correct Procedure: Yes   Correct Site: Yes   Correct Laterality: Yes   Correct Position: Yes   Site Marked: Yes   .   Procedure Documentation    .    Procedure:    Femoral and Adductor canal.  Local skin infiltrated with 3 mL of 1% lidocaine.     Ultrasound used to identify targeted nerve, plexus, or vascular marker and placed a needle adjacent to it., Ultrasound was used to visualize the spread of the anesthetic in close proximity to the above stated nerve. A permanent image is entered into the patient's record.  Patient Prep;mask, sterile gloves, chlorhexidine gluconate and isopropyl alcohol, patient draped.  .  Needle: insulated, short bevel (20 G. 80 mm ). .  Spinal Needle: . . .     Assessment/Narrative  Paresthesias: No.  Injection made incrementally with aspirations every 5 mL..  The placement was negative for: blood aspirated, painful injection and site bleeding.  Bolus given via needle..   Secured via.   Complications: none. Comments:  Single shot femoral nerve block bilateral.  No complications throughout  20 ml 0.5% Bupivacaine with 1:400,000 Epinephrine on each side., 40 mL total.

## 2017-04-26 NOTE — ANESTHESIA PROCEDURE NOTES
Peripheral nerve/Neuraxial procedure note : intrathecal  Pre-Procedure  Performed by MARK DWYER  Location: OR      Pre-Anesthestic Checklist: patient identified, IV checked, risks and benefits discussed, informed consent, monitors and equipment checked and pre-op evaluation    Timeout  Correct Patient: Yes   Correct Procedure: Yes   Correct Site: Yes   Correct Laterality: N/A   Correct Position: Yes   Site Marked: N/A   .   Procedure Documentation    .    Procedure:    Intrathecal.  Insertion Site:L3-4  (midline approach)      Patient Prep;mask, sterile gloves, povidone-iodine 7.5% surgical scrub, patient draped.  .  Needle: (). # of attempts: 1. # of redirects:. Spinal Needle: Amita tip 25 G. 3.5 in.  Introducer used. Introducer: 20 G. .     Assessment/Narrative  Paresthesias: No.  .  .  clear CSF fluid removed . Comments:  Subarachnoid Block.  Clear CSF on first pass.  Free flowing pre and post injection.  No abnormal pain or paresthesia throughout.  Patient tolerated well.   1ml 1%% tetracaine with dextrose and 100 mcg epinephrine

## 2017-04-26 NOTE — IP AVS SNAPSHOT
[address] [facility]    [address] [place] [address]    Phone:  [phone]                                       After Visit Summary   4/26/2017    Perla Sanderson    MRN: [id number]           After Visit Summary Signature Page     I have received my discharge instructions, and my questions have been answered. I have discussed any challenges I see with this plan with the nurse or doctor.    ..........................................................................................................................................  Patient/Patient Representative Signature      ..........................................................................................................................................  Patient Representative Print Name and Relationship to Patient    ..................................................               ................................................  Date                                            Time    ..........................................................................................................................................  Reviewed by Signature/Title    ...................................................              ..............................................  Date                                                            Time

## 2017-04-27 ENCOUNTER — APPOINTMENT (OUTPATIENT)
Dept: PHYSICAL THERAPY | Facility: CLINIC | Age: 52
DRG: 462 | End: 2017-04-27
Attending: ORTHOPAEDIC SURGERY
Payer: COMMERCIAL

## 2017-04-27 LAB
ANION GAP SERPL CALCULATED.3IONS-SCNC: 5 MMOL/L (ref 3–14)
BUN SERPL-MCNC: 12 MG/DL (ref 7–30)
CALCIUM SERPL-MCNC: 7.8 MG/DL (ref 8.5–10.1)
CHLORIDE SERPL-SCNC: 102 MMOL/L (ref 94–109)
CO2 SERPL-SCNC: 31 MMOL/L (ref 20–32)
CREAT SERPL-MCNC: 0.78 MG/DL (ref 0.52–1.04)
GFR SERPL CREATININE-BSD FRML MDRD: 79 ML/MIN/1.7M2
GLUCOSE SERPL-MCNC: 110 MG/DL (ref 70–99)
HGB BLD-MCNC: 10.9 G/DL (ref 11.7–15.7)
POTASSIUM SERPL-SCNC: 3.9 MMOL/L (ref 3.4–5.3)
SODIUM SERPL-SCNC: 138 MMOL/L (ref 133–144)

## 2017-04-27 PROCEDURE — 12000007 ZZH R&B INTERMEDIATE

## 2017-04-27 PROCEDURE — 97530 THERAPEUTIC ACTIVITIES: CPT | Mod: GP | Performed by: PHYSICAL THERAPIST

## 2017-04-27 PROCEDURE — 97110 THERAPEUTIC EXERCISES: CPT | Mod: GP | Performed by: PHYSICAL THERAPIST

## 2017-04-27 PROCEDURE — 97116 GAIT TRAINING THERAPY: CPT | Mod: GP | Performed by: PHYSICAL THERAPIST

## 2017-04-27 PROCEDURE — 36415 COLL VENOUS BLD VENIPUNCTURE: CPT | Performed by: PHYSICIAN ASSISTANT

## 2017-04-27 PROCEDURE — 85018 HEMOGLOBIN: CPT | Performed by: PHYSICIAN ASSISTANT

## 2017-04-27 PROCEDURE — 40000193 ZZH STATISTIC PT WARD VISIT: Performed by: PHYSICAL THERAPIST

## 2017-04-27 PROCEDURE — 25000128 H RX IP 250 OP 636: Performed by: PHYSICIAN ASSISTANT

## 2017-04-27 PROCEDURE — 80048 BASIC METABOLIC PNL TOTAL CA: CPT | Performed by: PHYSICIAN ASSISTANT

## 2017-04-27 PROCEDURE — 99207 ZZC NON-BILLABLE SERV PER CHARTING: CPT | Performed by: PHYSICIAN ASSISTANT

## 2017-04-27 PROCEDURE — 25000132 ZZH RX MED GY IP 250 OP 250 PS 637: Performed by: PHYSICIAN ASSISTANT

## 2017-04-27 RX ADMIN — OXYCODONE HYDROCHLORIDE 10 MG: 5 TABLET ORAL at 23:01

## 2017-04-27 RX ADMIN — SODIUM CHLORIDE: 9 INJECTION, SOLUTION INTRAVENOUS at 02:38

## 2017-04-27 RX ADMIN — HYDROXYZINE HYDROCHLORIDE 25 MG: 25 TABLET ORAL at 08:16

## 2017-04-27 RX ADMIN — OXYCODONE HYDROCHLORIDE 10 MG: 5 TABLET ORAL at 16:32

## 2017-04-27 RX ADMIN — PAROXETINE 40 MG: 40 TABLET, FILM COATED ORAL at 14:19

## 2017-04-27 RX ADMIN — OXYCODONE HYDROCHLORIDE 10 MG: 5 TABLET ORAL at 10:27

## 2017-04-27 RX ADMIN — ACETAMINOPHEN 975 MG: 325 TABLET, FILM COATED ORAL at 08:15

## 2017-04-27 RX ADMIN — ASPIRIN 325 MG ORAL TABLET 325 MG: 325 PILL ORAL at 08:16

## 2017-04-27 RX ADMIN — OXYCODONE HYDROCHLORIDE 10 MG: 5 TABLET ORAL at 13:27

## 2017-04-27 RX ADMIN — HYDROMORPHONE HYDROCHLORIDE 0.5 MG: 1 INJECTION, SOLUTION INTRAMUSCULAR; INTRAVENOUS; SUBCUTANEOUS at 02:38

## 2017-04-27 RX ADMIN — OXYCODONE HYDROCHLORIDE 10 MG: 5 TABLET ORAL at 07:37

## 2017-04-27 RX ADMIN — SENNOSIDES AND DOCUSATE SODIUM 2 TABLET: 8.6; 5 TABLET ORAL at 08:16

## 2017-04-27 RX ADMIN — OXYCODONE HYDROCHLORIDE 10 MG: 5 TABLET ORAL at 20:12

## 2017-04-27 RX ADMIN — HYDROMORPHONE HYDROCHLORIDE 0.5 MG: 1 INJECTION, SOLUTION INTRAMUSCULAR; INTRAVENOUS; SUBCUTANEOUS at 14:56

## 2017-04-27 RX ADMIN — HYDROXYZINE HYDROCHLORIDE 25 MG: 25 TABLET ORAL at 20:12

## 2017-04-27 RX ADMIN — CEFAZOLIN SODIUM 2 G: 2 INJECTION, SOLUTION INTRAVENOUS at 13:27

## 2017-04-27 RX ADMIN — CLONAZEPAM 1 MG: 1 TABLET ORAL at 23:28

## 2017-04-27 RX ADMIN — HYDROMORPHONE HYDROCHLORIDE 0.5 MG: 1 INJECTION, SOLUTION INTRAMUSCULAR; INTRAVENOUS; SUBCUTANEOUS at 17:47

## 2017-04-27 RX ADMIN — SENNOSIDES AND DOCUSATE SODIUM 2 TABLET: 8.6; 5 TABLET ORAL at 20:13

## 2017-04-27 RX ADMIN — OXYCODONE HYDROCHLORIDE 10 MG: 5 TABLET ORAL at 04:16

## 2017-04-27 RX ADMIN — CEFAZOLIN SODIUM 2 G: 2 INJECTION, SOLUTION INTRAVENOUS at 04:16

## 2017-04-27 RX ADMIN — ACETAMINOPHEN 975 MG: 325 TABLET, FILM COATED ORAL at 16:32

## 2017-04-27 RX ADMIN — HYDROXYZINE HYDROCHLORIDE 25 MG: 25 TABLET ORAL at 14:19

## 2017-04-27 NOTE — PROGRESS NOTES
HOSPITALIST CHART CHECK:      Assessment & Plan   Perla Sanderson is a 51 year old female who was admitted on 4/26/2017.    Past medical history of anxiety who is status post bilateral knee arthroplasties. Hospitalist consulted for medical co-management and has remained stable overnight.  The Hospitalist service will sign off.      Status post bilateral knee arthroplasties:  Postoperative day #1.   -Routine postoperative cares and pain control will be deferred to Orthopedics.     Postoperative hypotension:  VSS stable.  The patient is asymptomatic. She has low normal blood pressure at baseline.   -IV fluids to 100 mL per hour.     Anxiety/depression:   Continue prior to admission Paxil, Klonopin, and p.r.n. Ambien.     DVT Prophylaxis: ASA full dose and PCD's  Code Status: Full Code      Cecil Mcpherson PA-C  [phone]

## 2017-04-27 NOTE — PROVIDER NOTIFICATION
Paged out oncall  Ortho  regarding hemovac Out put 350 ml from each with in 7 hrs. & got order to clamp each Hemovac for 30min & unclamp it.

## 2017-04-27 NOTE — PROGRESS NOTES
"Perla Sanderson  2017  POD # sp bilat tka    Admit Date:  2017      Doing well.  Normal healing wound.  No immediate surgical complications identified.  No excessive bleeding  Pain well-controlled.  Objective:  Blood pressure 107/57, pulse 76, temperature 98  F (36.7  C), temperature source Oral, resp. rate 16, height 1.905 m (6' 3\"), weight 84.8 kg (187 lb), SpO2 96 %, not currently breastfeeding.    Temperatures:  Current - Temp: 98  F (36.7  C); Max - Temp  Av.8  F (36.6  C)  Min: 97.3  F (36.3  C)  Max: 98.1  F (36.7  C)  Pulse range: Pulse  Av.7  Min: 63  Max: 76  Blood pressure range: Systolic (24hrs), Av , Min:85 , Max:113   ; Diastolic (24hrs), Av, Min:48, Max:62    Exam:  CMS: intact  alert, stable, wound ok  Calf nontender b le.       Labs:  Recent Labs   Lab Test  17   0537   POTASSIUM  3.9     Recent Labs   Lab Test  17   0537   HGB  10.9*     No results for input(s): INR in the last 95443 hours.  No results for input(s): PLT in the last 06940 hours.    PLAN: Weight bearing as tolerated  Start physical therapy  Pain control measures  Cont post-op routine.       "

## 2017-04-27 NOTE — PLAN OF CARE
Problem: Goal Outcome Summary  Goal: Goal Outcome Summary  Outcome: Improving  Patient A&Ox4, VSS except soft BP, oxygen  1LPM . CMS intact. Dressing c/d/i. cisneros & Hemovac(2X) Patent. Pain managed with tylenol, oxycodone & iv dilaudid. I infusing. On Regular diet. Will continue monitoring

## 2017-04-27 NOTE — PLAN OF CARE
Problem: Goal Outcome Summary  Goal: Goal Outcome Summary  Outcome: Improving  Pt having good pain control with PRN meds and ice. Up with 1/ww to BSC. Walked hallway today with PT. Sat in chair X2. Good appetite. IV SL. Progressing towards plan of care. 2 small blisters on right posterior thigh from immobilizer.

## 2017-04-28 ENCOUNTER — APPOINTMENT (OUTPATIENT)
Dept: PHYSICAL THERAPY | Facility: CLINIC | Age: 52
DRG: 462 | End: 2017-04-28
Attending: ORTHOPAEDIC SURGERY
Payer: COMMERCIAL

## 2017-04-28 ENCOUNTER — APPOINTMENT (OUTPATIENT)
Dept: OCCUPATIONAL THERAPY | Facility: CLINIC | Age: 52
DRG: 462 | End: 2017-04-28
Attending: PHYSICIAN ASSISTANT
Payer: COMMERCIAL

## 2017-04-28 LAB
GLUCOSE BLDC GLUCOMTR-MCNC: 132 MG/DL (ref 70–99)
HGB BLD-MCNC: 10.7 G/DL (ref 11.7–15.7)

## 2017-04-28 PROCEDURE — 36415 COLL VENOUS BLD VENIPUNCTURE: CPT | Performed by: PHYSICIAN ASSISTANT

## 2017-04-28 PROCEDURE — 97535 SELF CARE MNGMENT TRAINING: CPT | Mod: GO | Performed by: OCCUPATIONAL THERAPIST

## 2017-04-28 PROCEDURE — 40000133 ZZH STATISTIC OT WARD VISIT: Performed by: OCCUPATIONAL THERAPIST

## 2017-04-28 PROCEDURE — 25000132 ZZH RX MED GY IP 250 OP 250 PS 637: Performed by: PHYSICIAN ASSISTANT

## 2017-04-28 PROCEDURE — 97166 OT EVAL MOD COMPLEX 45 MIN: CPT | Mod: GO | Performed by: OCCUPATIONAL THERAPIST

## 2017-04-28 PROCEDURE — 85018 HEMOGLOBIN: CPT | Performed by: PHYSICIAN ASSISTANT

## 2017-04-28 PROCEDURE — 97530 THERAPEUTIC ACTIVITIES: CPT | Mod: GP | Performed by: PHYSICAL THERAPIST

## 2017-04-28 PROCEDURE — 97116 GAIT TRAINING THERAPY: CPT | Mod: GP | Performed by: PHYSICAL THERAPIST

## 2017-04-28 PROCEDURE — 40000193 ZZH STATISTIC PT WARD VISIT: Performed by: PHYSICAL THERAPIST

## 2017-04-28 PROCEDURE — 00000146 ZZHCL STATISTIC GLUCOSE BY METER IP

## 2017-04-28 PROCEDURE — 12000007 ZZH R&B INTERMEDIATE

## 2017-04-28 PROCEDURE — 97110 THERAPEUTIC EXERCISES: CPT | Mod: GP | Performed by: PHYSICAL THERAPIST

## 2017-04-28 PROCEDURE — 25000128 H RX IP 250 OP 636: Performed by: PHYSICIAN ASSISTANT

## 2017-04-28 RX ORDER — ASPIRIN 325 MG
325 TABLET ORAL DAILY
Qty: 30 TABLET | Refills: 0 | Status: SHIPPED | OUTPATIENT
Start: 2017-04-28

## 2017-04-28 RX ORDER — OXYCODONE HYDROCHLORIDE 5 MG/1
5-10 TABLET ORAL
Qty: 50 TABLET | Refills: 0 | Status: SHIPPED | OUTPATIENT
Start: 2017-04-28

## 2017-04-28 RX ORDER — AMOXICILLIN 250 MG
1-2 CAPSULE ORAL 2 TIMES DAILY
Qty: 100 TABLET | Refills: 1 | Status: SHIPPED | OUTPATIENT
Start: 2017-04-28

## 2017-04-28 RX ADMIN — OXYCODONE HYDROCHLORIDE 10 MG: 5 TABLET ORAL at 17:00

## 2017-04-28 RX ADMIN — OXYCODONE HYDROCHLORIDE 10 MG: 5 TABLET ORAL at 13:59

## 2017-04-28 RX ADMIN — OXYCODONE HYDROCHLORIDE 10 MG: 5 TABLET ORAL at 23:03

## 2017-04-28 RX ADMIN — HYDROXYZINE HYDROCHLORIDE 25 MG: 25 TABLET ORAL at 08:03

## 2017-04-28 RX ADMIN — OXYCODONE HYDROCHLORIDE 10 MG: 5 TABLET ORAL at 05:07

## 2017-04-28 RX ADMIN — HYDROXYZINE HYDROCHLORIDE 25 MG: 25 TABLET ORAL at 02:01

## 2017-04-28 RX ADMIN — HYDROXYZINE HYDROCHLORIDE 25 MG: 25 TABLET ORAL at 20:07

## 2017-04-28 RX ADMIN — HYDROMORPHONE HYDROCHLORIDE 0.5 MG: 1 INJECTION, SOLUTION INTRAMUSCULAR; INTRAVENOUS; SUBCUTANEOUS at 00:08

## 2017-04-28 RX ADMIN — HYDROMORPHONE HYDROCHLORIDE 0.5 MG: 1 INJECTION, SOLUTION INTRAMUSCULAR; INTRAVENOUS; SUBCUTANEOUS at 16:07

## 2017-04-28 RX ADMIN — SENNOSIDES AND DOCUSATE SODIUM 1 TABLET: 8.6; 5 TABLET ORAL at 20:07

## 2017-04-28 RX ADMIN — OXYCODONE HYDROCHLORIDE 10 MG: 5 TABLET ORAL at 08:03

## 2017-04-28 RX ADMIN — ACETAMINOPHEN 975 MG: 325 TABLET, FILM COATED ORAL at 23:04

## 2017-04-28 RX ADMIN — ACETAMINOPHEN 975 MG: 325 TABLET, FILM COATED ORAL at 16:07

## 2017-04-28 RX ADMIN — SENNOSIDES AND DOCUSATE SODIUM 2 TABLET: 8.6; 5 TABLET ORAL at 08:03

## 2017-04-28 RX ADMIN — OXYCODONE HYDROCHLORIDE 10 MG: 5 TABLET ORAL at 02:01

## 2017-04-28 RX ADMIN — ACETAMINOPHEN 975 MG: 325 TABLET, FILM COATED ORAL at 08:03

## 2017-04-28 RX ADMIN — PAROXETINE 40 MG: 40 TABLET, FILM COATED ORAL at 14:01

## 2017-04-28 RX ADMIN — HYDROXYZINE HYDROCHLORIDE 25 MG: 25 TABLET ORAL at 13:59

## 2017-04-28 RX ADMIN — OXYCODONE HYDROCHLORIDE 10 MG: 5 TABLET ORAL at 20:07

## 2017-04-28 RX ADMIN — OXYCODONE HYDROCHLORIDE 10 MG: 5 TABLET ORAL at 10:55

## 2017-04-28 RX ADMIN — ASPIRIN 325 MG ORAL TABLET 325 MG: 325 PILL ORAL at 08:03

## 2017-04-28 RX ADMIN — ACETAMINOPHEN 975 MG: 325 TABLET, FILM COATED ORAL at 00:06

## 2017-04-28 ASSESSMENT — ACTIVITIES OF DAILY LIVING (ADL): PREVIOUS_RESPONSIBILITIES: MEAL PREP;HOUSEKEEPING;LAUNDRY;SHOPPING;MEDICATION MANAGEMENT;FINANCES;DRIVING

## 2017-04-28 NOTE — PROGRESS NOTES
SWS PROGRESS NOTE:     D/I: Care Coordinator met with pt to discuss DC plans. Pt had been asking about possible TCU placement earlier today. SW reviewed chart and discussed case with Javon from ARU. Pt is not a candidate for ARU since she is up with Ax1 and a walker. ADRIANA and Kevon from PT discussed DC options. Pt feels that she has adequate support at home and would like to return home on DC. CC discussed DC plan of home with HC with Dr. Chauhan who is in agreement with plan. Pt requested to use [facility]. [facility] with [facility] date [facility]29.   A: Pt is A&Ox4; she has a strong support system at home and on DC.   P: Pt is requesting [facility] on DC. SW will follow for DC planning and as needed.     Yue Cheng, VICTORINO, LGSW [phone]

## 2017-04-28 NOTE — PLAN OF CARE
Problem: Goal Outcome Summary  Goal: Goal Outcome Summary  Outcome: Therapy, progress toward functional goals as expected  OT eval completed and treatment intitated.  Surgeon Discharge Plan: none stated in chart. Pt has a friend that can come and stay with her to assist at discharge but is also open to TCU if that would be beneficial     Current Functional Status: pt is SBA with bed mobility, completed toilet transfer with SBA using raised toilet seat with handles.     Barriers to Plan/Home: steps in home, but can stay on main level, no equipment at this time, increased pain

## 2017-04-28 NOTE — PLAN OF CARE
Problem: Goal Outcome Summary  Goal: Goal Outcome Summary  Outcome: Improving  Pt is A & O. VSS on I L O2 at [facility]. CMS intact. Up with A1 with a walker and gait belt. Dressing c/d/i, knee immobilizers in place. Pain managed by PO oxycodone, tylenol and IV dilaudid. Voiding adequately in the BSC.  IV SL. Progressing well per plan of care.

## 2017-04-28 NOTE — PLAN OF CARE
Problem: Goal Outcome Summary  Goal: Goal Outcome Summary  Outcome: Improving  Pt A/O, VSS on RA. Up with 1. CMS intact. Dressing changed, incision WNL. Pain managed with oxycodone & atarax. Progressing per plan of care. Will continue to monitor.

## 2017-04-28 NOTE — PLAN OF CARE
Problem: Goal Outcome Summary  Goal: Goal Outcome Summary  Surgeon Discharge Plan: Not documented.  Patient has been planning for disch to home with help of a friend and OPPT, but is also open to disch to TCU or ARU pending progress and what is found to be most adventageous.      Current Functional Status: PT: Needs Ru and vc for exercise, bed mobility, transfers, and gait with FWW and bilateral knee immobilizers, 40', followed by w/c. Knee ROM: (L) 12-80 degrees; and (R) 10-75 degrees.     Barriers to Plan/Home: Postop pain, edema, weakness, and lives in 4 story house with full flights of stairs between floors.

## 2017-04-28 NOTE — PROGRESS NOTES
04/28/17 1345   Quick Adds   Type of Visit Initial Occupational Therapy Evaluation   Living Environment   Lives With child(vasu), dependent   Living Arrangements house   Home Accessibility bed not on first floor;tub/shower is not walk in  (family has put a twin bed on the main level)   Number of Stairs to Enter Home 0   Number of Stairs Within Home 8   Stair Railings at Home inside, present on right side   Transportation Available family or friend will provide   Living Environment Comment pt was I with ADL's and IADL's prior to surgery   Self-Care   Dominant Hand right   Usual Activity Tolerance excellent   Current Activity Tolerance good   Regular Exercise no   Equipment Currently Used at Home none   Activity/Exercise/Self-Care Comment has tub shower, regular height    Functional Level Prior   Fall history within last six months no   General Information   Onset of Illness/Injury or Date of Surgery - Date 04/26/17   Referring Physician Catalina   Patient/Family Goals Statement would like to go home   Additional Occupational Profile Info/Pertinent History of Current Problem pt is s/p B TKA   Precautions/Limitations fall precautions   General Observations pt is supine in bed with HOB up.    General Info Comments agreeable to OT eval   Cognitive Status Examination   Orientation orientation to person, place and time   Level of Consciousness alert   Able to Follow Commands WNL/WFL   Personal Safety (Cognitive) WNL/WFL   Memory intact   Attention No deficits were identified   Organization/Problem Solving No deficits were identified   Visual Perception   Visual Perception Wears glasses   Sensory Examination   Sensory Quick Adds No deficits were identified   Pain Assessment   Patient Currently in Pain No   Integumentary/Edema   Integumentary/Edema no deficits were identifed   Range of Motion (ROM)   ROM Quick Adds No deficits were identified   Strength   Manual Muscle Testing Quick Adds No deficits were identified   Muscle  Tone Assessment   Muscle Tone Quick Adds No deficits were identified   Transfer Skill: Bed to Chair/Chair to Bed   Level of Grainger: Bed to Chair stand-by assist   Physical Assist/Nonphysical Assist: Bed to Chair supervision   Assistive Device - Transfer Skill Bed to Chair Chair to Bed Rehab Eval rolling walker   Transfer Skill: Sit to Stand   Level of Grainger: Sit/Stand stand-by assist   Physical Assist/Nonphysical Assist: Sit/Stand supervision   Assistive Device for Transfer: Sit/Stand rolling walker   Transfer Skill: Toilet Transfer   Level of Grainger: Toilet stand-by assist   Physical Assist/Nonphysical Assist: Toilet supervision   Weight-Bearing Restrictions: Toilet weight-bearing as tolerated   Assistive Device rolling walker;grab bars;seat riser   Upper Body Dressing   Level of Grainger: Dress Upper Body minimum assist (75% patients effort)   Lower Body Dressing   Level of Grainger: Dress Lower Body moderate assist (50% patients effort)   Grooming   Level of Grainger: Grooming stand-by assist   Physical Assist/Nonphysical Assist: Grooming supervision   Eating/Self Feeding   Level of Grainger: Eating independent   Physical Assist/Nonphysical Assist: Eating set-up required   Instrumental Activities of Daily Living (IADL)   Previous Responsibilities meal prep;housekeeping;laundry;shopping;medication management;finances;driving   Activities of Daily Living Analysis   Impairments Contributing to Impaired Activities of Daily Living flexibility decreased;pain;ROM decreased   General Therapy Interventions   Planned Therapy Interventions ADL retraining;transfer training   Clinical Impression   Criteria for Skilled Therapeutic Interventions Met yes, treatment indicated   OT Diagnosis decreased I with ADL's and functional mobility   Influenced by the following impairments increased pain   Assessment of Occupational Performance 3-5 Performance Deficits   Identified Performance Deficits  "decreased I with dressing, grooming, home mgmt, and functional mobility   Clinical Decision Making (Complexity) Moderate complexity   Therapy Frequency daily   Predicted Duration of Therapy Intervention (days/wks) 4 days/week   Anticipated Equipment Needs at Discharge shower chair;raised toilet seat   Anticipated Discharge Disposition Home with Assist   Risks and Benefits of Treatment have been explained. Yes   Patient, Family & other staff in agreement with plan of care Yes   [employer] TM \"6 Clicks\"   2016, Trustees of [facility], under license to [employer].  All rights reserved.   6 Clicks Short Forms Daily Activity Inpatient Short Form   [facility]  \"6 Clicks\" Daily Activity Inpatient Short Form   1. Putting on and taking off regular lower body clothing? 3 - A Little   2. Bathing (including washing, rinsing, drying)? 3 - A Little   3. Toileting, which includes using toilet, bedpan or urinal? 3 - A Little   4. Putting on and taking off regular upper body clothing? 4 - None   5. Taking care of personal grooming such as brushing teeth? 4 - None   6. Eating meals? 4 - None   Daily Activity Raw Score (Score out of 24.Lower scores equate to lower levels of function) 21   Total Evaluation Time   Total Evaluation Time (Minutes) 10     "

## 2017-04-28 NOTE — PROGRESS NOTES
"Perla Sanderson  2017  POD # 2 sp bilat tka    Admit Date:  2017      Doing well.  Normal healing wound.  No immediate surgical complications identified.  No excessive bleeding  Pain well-controlled.  Tolerating physical therapy and rehabilitation well.  Objective:  Blood pressure 125/63, pulse 79, temperature 98.6  F (37  C), temperature source Oral, resp. rate 16, height 1.905 m (6' 3\"), weight 84.8 kg (187 lb), SpO2 98 %, not currently breastfeeding.    Temperatures:  Current - Temp: 98.6  F (37  C); Max - Temp  Av.5  F (36.9  C)  Min: 97.5  F (36.4  C)  Max: 98.9  F (37.2  C)  Pulse range: Pulse  Av  Min: 79  Max: 79  Blood pressure range: Systolic (24hrs), Av , Min:108 , Max:126   ; Diastolic (24hrs), Av, Min:55, Max:63    Exam:  CMS: intact  alert, stable, wound ok  Calf nontender b le.       Labs:  Recent Labs   Lab Test  17   0537   POTASSIUM  3.9     Recent Labs   Lab Test  17   0624  17   0537   HGB  10.7*  10.9*     No results for input(s): INR in the last 96196 hours.  No results for input(s): PLT in the last 19316 hours.    PLAN: Weight bearing as tolerated  Continue physical therapy  Pain control measures  Pt may need short stay in TCU.      "

## 2017-04-29 ENCOUNTER — APPOINTMENT (OUTPATIENT)
Dept: PHYSICAL THERAPY | Facility: CLINIC | Age: 52
DRG: 462 | End: 2017-04-29
Attending: ORTHOPAEDIC SURGERY
Payer: COMMERCIAL

## 2017-04-29 ENCOUNTER — APPOINTMENT (OUTPATIENT)
Dept: OCCUPATIONAL THERAPY | Facility: CLINIC | Age: 52
DRG: 462 | End: 2017-04-29
Attending: ORTHOPAEDIC SURGERY
Payer: COMMERCIAL

## 2017-04-29 VITALS
WEIGHT: 187 LBS | HEART RATE: 79 BPM | BODY MASS INDEX: 25.33 KG/M2 | OXYGEN SATURATION: 99 % | HEIGHT: 72 IN | DIASTOLIC BLOOD PRESSURE: 65 MMHG | RESPIRATION RATE: 16 BRPM | TEMPERATURE: 97 F | SYSTOLIC BLOOD PRESSURE: 124 MMHG

## 2017-04-29 PROCEDURE — 40000133 ZZH STATISTIC OT WARD VISIT: Performed by: OCCUPATIONAL THERAPY ASSISTANT

## 2017-04-29 PROCEDURE — 97530 THERAPEUTIC ACTIVITIES: CPT | Mod: GP | Performed by: PHYSICAL THERAPIST

## 2017-04-29 PROCEDURE — 40000193 ZZH STATISTIC PT WARD VISIT: Performed by: PHYSICAL THERAPIST

## 2017-04-29 PROCEDURE — 97110 THERAPEUTIC EXERCISES: CPT | Mod: GP | Performed by: PHYSICAL THERAPIST

## 2017-04-29 PROCEDURE — 97530 THERAPEUTIC ACTIVITIES: CPT | Mod: GO | Performed by: OCCUPATIONAL THERAPY ASSISTANT

## 2017-04-29 PROCEDURE — 97116 GAIT TRAINING THERAPY: CPT | Mod: GP | Performed by: PHYSICAL THERAPIST

## 2017-04-29 PROCEDURE — 25000132 ZZH RX MED GY IP 250 OP 250 PS 637: Performed by: PHYSICIAN ASSISTANT

## 2017-04-29 RX ORDER — HYDROXYZINE HYDROCHLORIDE 25 MG/1
25 TABLET, FILM COATED ORAL EVERY 6 HOURS PRN
Qty: 40 TABLET | Refills: 0 | Status: SHIPPED | OUTPATIENT
Start: 2017-04-29

## 2017-04-29 RX ADMIN — OXYCODONE HYDROCHLORIDE 10 MG: 5 TABLET ORAL at 02:00

## 2017-04-29 RX ADMIN — SENNOSIDES AND DOCUSATE SODIUM 2 TABLET: 8.6; 5 TABLET ORAL at 08:04

## 2017-04-29 RX ADMIN — ACETAMINOPHEN 975 MG: 325 TABLET, FILM COATED ORAL at 08:04

## 2017-04-29 RX ADMIN — OXYCODONE HYDROCHLORIDE 10 MG: 5 TABLET ORAL at 05:09

## 2017-04-29 RX ADMIN — ASPIRIN 325 MG ORAL TABLET 325 MG: 325 PILL ORAL at 08:03

## 2017-04-29 RX ADMIN — OXYCODONE HYDROCHLORIDE 10 MG: 5 TABLET ORAL at 08:06

## 2017-04-29 RX ADMIN — CLONAZEPAM 1 MG: 1 TABLET ORAL at 12:01

## 2017-04-29 RX ADMIN — HYDROXYZINE HYDROCHLORIDE 25 MG: 25 TABLET ORAL at 02:00

## 2017-04-29 RX ADMIN — OXYCODONE HYDROCHLORIDE 10 MG: 5 TABLET ORAL at 11:09

## 2017-04-29 RX ADMIN — PAROXETINE 40 MG: 40 TABLET, FILM COATED ORAL at 12:07

## 2017-04-29 RX ADMIN — HYDROXYZINE HYDROCHLORIDE 25 MG: 25 TABLET ORAL at 08:04

## 2017-04-29 NOTE — PLAN OF CARE
Problem: Individualization  Goal: Patient Preferences  Outcome: No Change  A&O x 4, VSS on RA, pain controlled with oral analgesics, drsg CDI, CMS intact, up with assist of 1 walker, voiding adequately in bathroom, IV SL,D/C in the AM, continue to monitor.]

## 2017-04-29 NOTE — PLAN OF CARE
"Problem: Goal Outcome Summary  Goal: Goal Outcome Summary  Surgeon Discharge Plan: \"Patient may need short stay in TCU.\"     Current Functional Status: PT: Patient reports 5/10 knee pain bilaterally.  Needs SBA and vc for exercise, bed mobility, transfers, and gait with FWW, 100', WBAT bilaterally, and to negotiate 2 steps supported on bilateral hand rails, using retro-ascent and forward descent.  L knee ROM: 6-85 degrees, and R knee ROM: 6-83 degrees.     Barriers to Plan/Home: Bilateral knee pain, edema, weakness, and multiple stairs.             "

## 2017-04-29 NOTE — PROGRESS NOTES
"Perla Sanderson  2017  POD # 3 sp bilat tka    Admit Date:  2017      Doing well.  Clean wound without signs of infection.  Normal healing wound.  No immediate surgical complications identified.  No excessive bleeding  Pain well-controlled.  Tolerating physical therapy and rehabilitation well.  Objective:  Blood pressure 124/65, pulse 79, temperature 97.8  F (36.6  C), temperature source Oral, resp. rate 16, height 1.905 m (6' 3\"), weight 84.8 kg (187 lb), SpO2 94 %, not currently breastfeeding.    Temperatures:  Current - Temp: 97.8  F (36.6  C); Max - Temp  Av.1  F (36.7  C)  Min: 97.8  F (36.6  C)  Max: 98.4  F (36.9  C)  Pulse range: No Data Recorded  Blood pressure range: Systolic (24hrs), Av , Min:115 , Max:127   ; Diastolic (24hrs), Av, Min:60, Max:73    Exam:  CMS: intact  alert, stable, wound ok  Calf nontender b le.       Labs:  Recent Labs   Lab Test  17   0537   POTASSIUM  3.9     Recent Labs   Lab Test  17   0624  17   0537   HGB  10.7*  10.9*     No results for input(s): INR in the last 57775 hours.  No results for input(s): PLT in the last 14306 hours.    PLAN: Weight bearing as tolerated  Continue physical therapy  Pain control measures  Dc to home today.       "

## 2017-04-29 NOTE — PLAN OF CARE
"Problem: Goal Outcome Summary  Goal: Goal Outcome Summary  Surgeon Discharge Plan: none stated but pt has a friend that can stay with her.      Current Functional Status: Pt educated on AE and safety with completing tub transfer. Pt CGA sit to stand, amb with FWW over to tub, completed tub transfer with use of shower chair SBA.  PT declined to trial toilet transfers stating \" I will be able to complete transfer with RTS\" Reviewed AE needs, will issue shower chair and BSC prior to d/c home today.      Barriers to Plan/Home: stairs     GOALS PARTIALLY MET, see discharge summary      "

## 2017-04-29 NOTE — PLAN OF CARE
Problem: Goal Outcome Summary  Goal: Goal Outcome Summary  Outcome: Improving  Up with assist of one and walker. Pt states pain well controlled with oxycodone and atarax. Prescription for atarax was sent to the [facility] in [place] for pt to  since it was added later and pt did not want to wait for it. All other medications were sent with pt. Brother here to transport pt home at 1215.

## 2017-04-29 NOTE — PROVIDER NOTIFICATION
Called JOSE Marrero as Dr. Chauhan did not prescribe atarax for pt to discharge home on and pt has been taking every 6 hours and requests medication for home. Betsy gave a phone order and requested it be sent to the [facility] in [place].

## 2017-04-29 NOTE — PLAN OF CARE
Problem: Goal Outcome Summary  Goal: Goal Outcome Summary  Outcome: Improving  A/OX4,CMS intact.Pain control with oxycodone and atarax.Up with 1 assist/walker.Dreg to BLE CDI.Voiding per bathroom.Pregressing per plan of the care,plan to discharge today.

## 2017-04-30 NOTE — PLAN OF CARE
"Problem: Goal Outcome Summary  Goal: Goal Outcome Summary  Surgeon Discharge Plan: \"DC to home today.\"     Current Functional Status: PT: Given SBA and vc for exercise, bed mobility, transfers, and gait with FWW, 340', WBAT bilaterally.  Knee ROM: (L) 6-90 degrees; and (R) 5-90 degrees.  Patient fitted and issued FWW at disch to home as ordered.       Barriers to Plan/Home: None.  Patient to disch to her home with help of her family and HHPT.     Physical Therapy Discharge Summary     Reason for therapy discharge:    Discharged to home with home therapy.     Progress towards therapy goal(s). See goals on Care Plan in [facility] health record for goal details.  Goals met     Therapy recommendation(s):    Continued therapy is recommended.  Rationale/Recommendations:  Will continue to need skilled PT for recovery of greater functional independence, mobility, and safety for negotiation of her chosen residence..  Continue home exercise program.             "

## 2017-05-06 NOTE — DISCHARGE SUMMARY
Physician Discharge Summary     Patient ID:  Perla Sanderson  [id number]  51 year old  1965    Admit date: 4/26/2017    Discharge date and time: 4/29/2017 12:00 PM     Admitting Physician: Michelle Chauhan MD     Discharge Physician: Michelle Chauhan MD     Admission Diagnoses: BILATERAL DJD   S/p total knee replacement, bilateral    Discharge Diagnoses: osteoarthritis of bilateral knees, s/p bilateral total knee arthroplasty    Admission Condition: stable    Discharged Condition: stable    Indication for Admission: s/p bilateral total knee arthroplasty    Hospital Course: bilateral total knee arthroplasty performed without complication    Discharge Exam:  See progress notes    Disposition: home    Patient Instructions:   Discharge Medication List as of 4/29/2017 12:13 PM      START taking these medications    Details   hydrOXYzine (ATARAX) 25 MG tablet Take 1 tablet (25 mg) by mouth every 6 hours as needed for itching, Disp-40 tablet, R-0, E-Prescribe      aspirin 325 MG tablet Take 1 tablet (325 mg) by mouth daily, Disp-30 tablet, R-0, E-Prescribe      oxyCODONE (ROXICODONE) 5 MG IR tablet Take 1-2 tablets (5-10 mg) by mouth every 3 hours as needed for moderate to severe pain, Disp-50 tablet, R-0, Local Print      senna-docusate (SENOKOT-S;PERICOLACE) 8.6-50 MG per tablet Take 1-2 tablets by mouth 2 times daily, Disp-100 tablet, R-1, E-Prescribe      order for DME Equipment being ordered: Walker Wheels () and Walker ()  Treatment Diagnosis: Impaired gaitDisp-1 each, R-0, Local Print         CONTINUE these medications which have NOT CHANGED    Details   Multiple Vitamins-Minerals (AIRBORNE) TBEF Take 1 Dose by mouth daily, Historical      ClonazePAM (KLONOPIN PO) Take 1 mg by mouth daily as needed for anxiety , Historical      Zolpidem Tartrate (AMBIEN PO) Take 10 mg by mouth nightly as needed for sleep (if going to bed late (after 2200)) , Historical      PAROXETINE HCL PO Take 40 mg by mouth every  24 hours At 1300, Historical         STOP taking these medications       Zolpidem Tartrate (AMBIEN CR PO) Comments:   Reason for Stopping:             Activity: activity as tolerated, no driving while on pain medication  Diet: regular diet  Wound Care: leave aquacel dressing in place until follow up appointment    Follow-up with Dr. Chauhan in 2 weeks.    Signed:  Di Arnold  5/5/2017  7:21 PM

## (undated) DEVICE — PREP CHLORAPREP 26ML TINTED ORANGE  260815

## (undated) DEVICE — DRAPE STERI TOWEL LG 1010

## (undated) DEVICE — SU VICRYL 2-0 CP-1 27" J466H

## (undated) DEVICE — GLOVE PROTEXIS BLUE W/NEU-THERA 6.5  2D73EB65

## (undated) DEVICE — DRSG GAUZE 4X4" 3033

## (undated) DEVICE — Device

## (undated) DEVICE — CAST PADDING 6" STERILE 9046S

## (undated) DEVICE — BLADE SAW RECIP STRK 70X12.5X1.2MM 0277-096-281

## (undated) DEVICE — GLOVE PROTEXIS W/NEU-THERA 8.0  2D73TE80

## (undated) DEVICE — WRAP EZY KNEE

## (undated) DEVICE — BONE CLEANING TIP INTERPULSE  0210-010-000

## (undated) DEVICE — HOOD FLYTE W/PEELAWAY 408-800-100

## (undated) DEVICE — DRAPE IOBAN INCISE 23X17" 6650EZ

## (undated) DEVICE — GLOVE PROTEXIS MICRO 8.0  2D73PM80

## (undated) DEVICE — MANIFOLD NEPTUNE 4 PORT 700-20

## (undated) DEVICE — DRSG ADAPTIC 3X8" 6113

## (undated) DEVICE — GLOVE PROTEXIS POWDER FREE 6.5 ORTHOPEDIC 2D73ET65

## (undated) DEVICE — SU ETHIBOND 0 CTX CR  8X18" CX31D

## (undated) DEVICE — SUCTION IRR SYSTEM W/O TIP INTERPULSE HANDPIECE 0210-100-000

## (undated) DEVICE — CAST PADDING 4" COTTON WEBRIL UNSTERILE 9084

## (undated) DEVICE — DRSG KERLIX FLUFFS X5

## (undated) DEVICE — ESU GROUND PAD UNIVERSAL W/O CORD

## (undated) DEVICE — CATH TRAY FOLEY COUDE 16FR SILVER W/URINE METER 350ML 304716

## (undated) DEVICE — STPL SKIN 35W APPOSE 8886803712

## (undated) DEVICE — SOL NACL 0.9% IRRIG 1000ML BOTTLE 07138-09

## (undated) DEVICE — DRSG ABDOMINAL 07 1/2X8" 7197D

## (undated) DEVICE — LINEN TOWEL PACK X5 5464

## (undated) DEVICE — CAST PADDING 6" UNSTERILE 9046

## (undated) DEVICE — PACK TOTAL KNEE SOP15TKFSD

## (undated) DEVICE — BLADE SAW SAGITTAL STRK 19.5X90X1.20MM 2108-109-000S17

## (undated) DEVICE — NDL SPINAL 18GA 3.5" 405184

## (undated) DEVICE — SUCTION TIP YANKAUER STR K87

## (undated) DEVICE — SYR 50ML LL W/O NDL 309653

## (undated) DEVICE — SU VICRYL 0 CP-1 27" J467H

## (undated) DEVICE — GLOVE PROTEXIS MICRO 6.5  2D73PM65

## (undated) DEVICE — SOL WATER IRRIG 1000ML BOTTLE 07139-09

## (undated) DEVICE — DRAIN ROUND W/RESERV KIT JACKSON PRATT 10FR 400ML SU130-402D

## (undated) DEVICE — SU MONOCRYL 3-0 PS-2 27" Y427H

## (undated) DEVICE — DRAPE EXTREMITY BILAT

## (undated) RX ORDER — HYDROMORPHONE HYDROCHLORIDE 1 MG/ML
INJECTION, SOLUTION INTRAMUSCULAR; INTRAVENOUS; SUBCUTANEOUS
Status: DISPENSED
Start: 2017-04-26

## (undated) RX ORDER — PROPOFOL 10 MG/ML
INJECTION, EMULSION INTRAVENOUS
Status: DISPENSED
Start: 2017-04-26

## (undated) RX ORDER — ACYCLOVIR 200 MG/1
CAPSULE ORAL
Status: DISPENSED
Start: 2017-04-26

## (undated) RX ORDER — FENTANYL CITRATE 50 UG/ML
INJECTION, SOLUTION INTRAMUSCULAR; INTRAVENOUS
Status: DISPENSED
Start: 2017-04-26

## (undated) RX ORDER — KETOROLAC TROMETHAMINE 30 MG/ML
INJECTION, SOLUTION INTRAMUSCULAR; INTRAVENOUS
Status: DISPENSED
Start: 2017-04-26

## (undated) RX ORDER — SCOLOPAMINE TRANSDERMAL SYSTEM 1 MG/1
PATCH, EXTENDED RELEASE TRANSDERMAL
Status: DISPENSED
Start: 2017-04-26

## (undated) RX ORDER — CEFAZOLIN SODIUM 2 G/100ML
INJECTION, SOLUTION INTRAVENOUS
Status: DISPENSED
Start: 2017-04-26

## (undated) RX ORDER — CEFAZOLIN SODIUM 1 G/3ML
INJECTION, POWDER, FOR SOLUTION INTRAMUSCULAR; INTRAVENOUS
Status: DISPENSED
Start: 2017-04-26

## (undated) RX ORDER — GINSENG 100 MG
CAPSULE ORAL
Status: DISPENSED
Start: 2017-04-26

## (undated) RX ORDER — ROPIVACAINE HYDROCHLORIDE 2 MG/ML
INJECTION, SOLUTION EPIDURAL; INFILTRATION; PERINEURAL
Status: DISPENSED
Start: 2017-04-26